# Patient Record
Sex: FEMALE | Race: WHITE | Employment: FULL TIME | URBAN - METROPOLITAN AREA
[De-identification: names, ages, dates, MRNs, and addresses within clinical notes are randomized per-mention and may not be internally consistent; named-entity substitution may affect disease eponyms.]

---

## 2017-03-14 ENCOUNTER — TELEPHONE (OUTPATIENT)
Dept: FAMILY MEDICINE | Facility: CLINIC | Age: 22
End: 2017-03-14

## 2017-03-14 NOTE — LETTER
[facility]                                                   [address] [place]  [address]    April 3, 2017    Jake CID Labrant  [address] [facility] [address]      Dear Jake,       After reviewing your chart, I have determined you are due for a PHQ-9 questionnaire. The PHQ-9 is a nine question survey tool that helps us determine how your depression is doing based on the last two weeks. Please complete the questionnaire and return in the envelope provided.          Thank you,   Patsy Andino PA-C/ks  [phone]

## 2017-03-14 NOTE — TELEPHONE ENCOUNTER
PHQ-9 due now for patient ( 5-7 months from index date)  Index date:       9/1/16  Index PHQ9 :   20  FU start date:   2/1/17  FU End date :   4/1/17    RN- Contact patient for PHQ-9. Remission considered if follow up PHQ-9 less than 5.  If greater than 5 consider follow up appointment, e-visit for medication follow up and evaluation.

## 2017-03-16 ENCOUNTER — OFFICE VISIT (OUTPATIENT)
Dept: URGENT CARE | Facility: URGENT CARE | Age: 22
End: 2017-03-16
Payer: COMMERCIAL

## 2017-03-16 VITALS
WEIGHT: 141 LBS | TEMPERATURE: 97.2 F | HEART RATE: 102 BPM | SYSTOLIC BLOOD PRESSURE: 142 MMHG | DIASTOLIC BLOOD PRESSURE: 83 MMHG | OXYGEN SATURATION: 98 % | BODY MASS INDEX: 25.79 KG/M2

## 2017-03-16 DIAGNOSIS — M26.609 TMJ (TEMPOROMANDIBULAR JOINT SYNDROME): ICD-10-CM

## 2017-03-16 DIAGNOSIS — G44.201 ACUTE INTRACTABLE TENSION-TYPE HEADACHE: Primary | ICD-10-CM

## 2017-03-16 PROCEDURE — 99214 OFFICE O/P EST MOD 30 MIN: CPT | Mod: 25 | Performed by: PHYSICIAN ASSISTANT

## 2017-03-16 PROCEDURE — 96372 THER/PROPH/DIAG INJ SC/IM: CPT | Performed by: PHYSICIAN ASSISTANT

## 2017-03-16 RX ORDER — KETOROLAC TROMETHAMINE 30 MG/ML
60 INJECTION, SOLUTION INTRAMUSCULAR; INTRAVENOUS ONCE
Qty: 2 ML | Refills: 0 | OUTPATIENT
Start: 2017-03-16 | End: 2017-03-16

## 2017-03-16 RX ORDER — CYCLOBENZAPRINE HCL 10 MG
10 TABLET ORAL
Qty: 14 TABLET | Refills: 1 | Status: SHIPPED | OUTPATIENT
Start: 2017-03-16 | End: 2017-10-17

## 2017-03-16 NOTE — MR AVS SNAPSHOT
After Visit Summary   3/16/2017    Jake Gordillo    MRN: [id number]           Patient Information     Date Of Birth          1995        Visit Information        Provider Department      3/16/2017 7:00 PM Jessica Newsome PA-C [facility]        Today's Diagnoses     Acute intractable tension-type headache    -  1    TMJ (temporomandibular joint syndrome)           Follow-ups after your visit        Who to contact     If you have questions or need follow up information about today's clinic visit or your schedule please contact [facility] directly at [phone].  Normal or non-critical lab and imaging results will be communicated to you by MyChart, letter or phone within 4 business days after the clinic has received the results. If you do not hear from us within 7 days, please contact the clinic through [email or web address] or phone. If you have a critical or abnormal lab result, we will notify you by phone as soon as possible.  Submit refill requests through [employer] or call your pharmacy and they will forward the refill request to us. Please allow 3 business days for your refill to be completed.          Additional Information About Your Visit        MyChart Information     [employer] gives you secure access to your electronic health record. If you see a primary care provider, you can also send messages to your care team and make appointments. If you have questions, please call your primary care clinic.  If you do not have a primary care provider, please call [phone] and they will assist you.        Care EveryWhere ID     This is your Care EveryWhere ID. This could be used by other organizations to access your [facility] medical records  [id number]        Your Vitals Were     Pulse Temperature Pulse Oximetry BMI (Body Mass Index)          102 97.2  F (36.2  C) (Oral) 98% 25.79 kg/m2         Blood Pressure from Last 3 Encounters:   03/16/17 142/83   11/28/16 135/84    11/17/16 130/80    Weight from Last 3 Encounters:   03/16/17 141 lb (64 kg)   12/09/16 137 lb (62.1 kg)   11/28/16 137 lb 12.8 oz (62.5 kg)              We Performed the Following     INJECTION INTRAMUSCULAR OR SUB-Q          Today's Medication Changes          These changes are accurate as of: 3/16/17  7:45 PM.  If you have any questions, ask your nurse or doctor.               Start taking these medicines.        Dose/Directions    cyclobenzaprine 10 MG tablet   Commonly known as:  FLEXERIL   Used for:  Acute intractable tension-type headache   Started by:  Jessica Newsome PA-C        Dose:  10 mg   Take 1 tablet (10 mg) by mouth nightly as needed for muscle spasms   Quantity:  14 tablet   Refills:  1       ketorolac 60 MG/2ML Soln injection   Commonly known as:  TORADOL   Used for:  Acute intractable tension-type headache   Started by:  Jessica Newsome PA-C        Dose:  60 mg   Inject 2 mLs (60 mg) into the muscle once for 1 dose   Quantity:  2 mL   Refills:  0            Where to get your medicines      These medications were sent to [id number] - [place], [place] - [address] [address] [facility]  [address] [address] [facility] [address]    Hours:  24-hours Phone:  [phone]     cyclobenzaprine 10 MG tablet         Some of these will need a paper prescription and others can be bought over the counter.  Ask your nurse if you have questions.     You don't need a prescription for these medications     ketorolac 60 MG/2ML Soln injection                Primary Care Provider Office Phone # Fax #    Kallie Goldberg [phone]       [facility] [address] [address] [address]        Thank you!     Thank you for choosing [facility]  for your care. Our goal is always to provide you with excellent care. Hearing back from our patients is one way we can continue to improve our services. Please take a few  minutes to complete the written survey that you may receive in the mail after your visit with us. Thank you!             Your Updated Medication List - Protect others around you: Learn how to safely use, store and throw away your medicines at www.disposemymeds.org.          This list is accurate as of: 3/16/17  7:45 PM.  Always use your most recent med list.                   Brand Name Dispense Instructions for use    chlorhexidine 0.12 % solution    PERIDEX    473 mL    Swish and spit 15 mLs in mouth 2 times daily       clindamycin 300 MG capsule    CLEOCIN    40 capsule    Take 1 capsule (300 mg) by mouth 4 times daily       cyclobenzaprine 10 MG tablet    FLEXERIL    14 tablet    Take 1 tablet (10 mg) by mouth nightly as needed for muscle spasms       dexamethasone 1 MG/ML (HIGH CONC) solution    DECADRON    10 mL    Take 10 mLs (10 mg) by mouth once for 1 dose       hydrOXYzine 25 MG tablet    ATARAX     Take 50 mg by mouth       ketorolac 60 MG/2ML Soln injection    TORADOL    2 mL    Inject 2 mLs (60 mg) into the muscle once for 1 dose       QUEtiapine 100 MG tablet    SEROquel     Take 100 mg by mouth Reported on 3/16/2017

## 2017-03-17 NOTE — NURSING NOTE
The following medication was given:     MEDICATION: Ketorolac Tromethamine 60MG/2ML (30 mg/mL) (Toradol)  ROUTE: IM  SITE: Deltoid - Right  DOSE: 2 ml  LOT #: [id number]   :  [email or web address]  EXPIRATION DATE:  02/30/2018  NDC#: 92861-559-30  Lacey PAULINO CMA ([facility])  8:07 PM 3/16/2017

## 2017-03-17 NOTE — PROGRESS NOTES
SUBJECTIVE:                                                    Jake Gordillo is a 22 year old female who presents to clinic today for the following health issues:      Headaches      Duration: 1 month    Description  Location: bilateral in the frontal area but last day on right. Vomit x1 today. Aunt with migraines. Light bothers her  Character: throbbing pain, constant pressure  Frequency:  daily  Duration:  Varies     Intensity:  moderate    Accompanying signs and symptoms:    Precipitating or Alleviating factors:  Nausea/vomiting: occasionally  Dizziness: no  Weakness or numbness: no  Visual changes: none  Fever: no   Sinus or URI symptoms no     History  Head trauma: no   Family history of migraines: YES  Previous tests for headaches: no   Neurologist evaluations: no   Able to do daily activities when headache present: no   Wake with headaches: YES  Daily pain medication use: YES  Any changes in: none    Precipitating or Alleviating factors (light/sound/sleep/caffeine): sleep    Therapies tried and outcome: Excedrin    Outcome - not effective  Frequent/daily pain medication use: YES         Past medical history and medications were reviewed and are up to date.    REVIEW OF SYSTEMS :   GENERAL: No change in weight, sleep or appetite.  Normal energy.  No fever or chills  RESP: No coughing, wheezing or shortness of breath  CV: No chest pains or palpitations  GI: No nausea, vomiting,  heartburn, abdominal pain, diarrhea, constipation or change in bowel habits  : No urinary frequency or dysuria, bladder or kidney problems    OBJECTIVE:  Blood pressure 142/83, pulse 102, temperature 97.2  F (36.2  C), temperature source Oral, weight 141 lb (64 kg), SpO2 98 %, not currently breastfeeding.    General: No apparent distress, alert and oriented.  HEENT:  EOMI, PERRL, sclera and conjunctiva normal.   Neck:  supple, no lymphadenopathy  Chest:  Lungs clear to auscultation bilaterally.  Cardiovascular: regular rate  and rhythm, no murmurs.  Musculoskeletal: no gross deformities, normal muscle tone  Psychological:  Affect and mentation normal.  Speech fluent.  Judgement and insight intact.  Neurological:    Cranial nerves 2-12 are grossly normal.    Strength 5/5  and symmetric in upper and lower extremities.     Sensation to light touch grossly intact throughout    Reflexes 2+ and symmetrical at biceps, knees.    Gait is normal.  R > L TMJ- tender with clicking.  Ears- TM's translucent    ASSESSMENT:    ICD-10-CM    1. Acute intractable tension-type headache G44.201 cyclobenzaprine (FLEXERIL) 10 MG tablet     ketorolac (TORADOL) 60 MG/2ML SOLN injection     INJECTION INTRAMUSCULAR OR SUB-Q   2. TMJ (temporomandibular joint syndrome) M26.609        PLAN: HA with ? Vascular and tension components. See Dentist. Avoid gum chewing.  Return to clinic if worse or not improved.    Jessica Newsome PA-C

## 2017-03-17 NOTE — NURSING NOTE
"Chief Complaint   Patient presents with     Headache       Initial /83  Pulse 102  Temp 97.2  F (36.2  C) (Oral)  Wt 141 lb (64 kg)  SpO2 98%  BMI 25.79 kg/m2 Estimated body mass index is 25.79 kg/(m^2) as calculated from the following:    Height as of 12/9/16: 5' 2\" (1.575 m).    Weight as of this encounter: 141 lb (64 kg).  BP completed using cuff size: humaira PAULINO CMA ([facility])  7:19 PM 3/16/2017    "

## 2017-03-28 NOTE — TELEPHONE ENCOUNTER
"[employer] message sent to patient again;  She did view the one from 3/14.  Diana Baer RN

## 2017-04-03 NOTE — TELEPHONE ENCOUNTER
Mailed PHQ9 Letter with PHQ9 and return envelope enclosed. Postponed x2 weeks for return.    Marie Mcmahon,

## 2017-04-11 ENCOUNTER — TELEPHONE (OUTPATIENT)
Dept: FAMILY MEDICINE | Facility: CLINIC | Age: 22
End: 2017-04-11

## 2017-04-11 NOTE — TELEPHONE ENCOUNTER
Left message on answering machine for patient/parent to call back.  [place] [phone]   Jahaira Mena RN

## 2017-04-11 NOTE — LETTER
[facility]                                                   [address] [place]  [address]    April 18, 2017    Jake CID Labrant  [address] [facility] [address]      Dear Jake,       After reviewing your chart, I have determined you are due for a PHQ-9 questionnaire. The PHQ-9 is a nine question survey tool that helps us determine how your depression is doing based on the last two weeks. Please complete the questionnaire and return in the envelope provided.          Thank you,   Patsy Andino PA-C/ks  [phone]

## 2017-05-18 DIAGNOSIS — F43.10 POSTTRAUMATIC STRESS DISORDER: Primary | ICD-10-CM

## 2017-05-18 LAB
CHOLEST SERPL-MCNC: 149 MG/DL
GLUCOSE SERPL-MCNC: 78 MG/DL (ref 70–99)
HBA1C MFR BLD: 5 % (ref 4.3–6)
HDLC SERPL-MCNC: 72 MG/DL
LDLC SERPL CALC-MCNC: 68 MG/DL
NONHDLC SERPL-MCNC: 77 MG/DL
TRIGL SERPL-MCNC: 47 MG/DL

## 2017-05-18 PROCEDURE — 83036 HEMOGLOBIN GLYCOSYLATED A1C: CPT | Performed by: PHYSICIAN ASSISTANT

## 2017-05-18 PROCEDURE — 82947 ASSAY GLUCOSE BLOOD QUANT: CPT | Performed by: PHYSICIAN ASSISTANT

## 2017-05-18 PROCEDURE — 36415 COLL VENOUS BLD VENIPUNCTURE: CPT | Performed by: PHYSICIAN ASSISTANT

## 2017-05-18 PROCEDURE — 80061 LIPID PANEL: CPT | Performed by: PHYSICIAN ASSISTANT

## 2017-10-01 ENCOUNTER — HEALTH MAINTENANCE LETTER (OUTPATIENT)
Age: 22
End: 2017-10-01

## 2017-10-17 ENCOUNTER — OFFICE VISIT (OUTPATIENT)
Dept: FAMILY MEDICINE | Facility: CLINIC | Age: 22
End: 2017-10-17
Payer: COMMERCIAL

## 2017-10-17 VITALS
DIASTOLIC BLOOD PRESSURE: 89 MMHG | WEIGHT: 150 LBS | BODY MASS INDEX: 27.44 KG/M2 | SYSTOLIC BLOOD PRESSURE: 156 MMHG | HEART RATE: 118 BPM | TEMPERATURE: 98.4 F

## 2017-10-17 DIAGNOSIS — N92.6 MISSED MENSES: Primary | ICD-10-CM

## 2017-10-17 LAB — BETA HCG QUAL IFA URINE: POSITIVE

## 2017-10-17 PROCEDURE — 99213 OFFICE O/P EST LOW 20 MIN: CPT | Performed by: FAMILY MEDICINE

## 2017-10-17 PROCEDURE — 84703 CHORIONIC GONADOTROPIN ASSAY: CPT | Performed by: FAMILY MEDICINE

## 2017-10-17 ASSESSMENT — ANXIETY QUESTIONNAIRES
2. NOT BEING ABLE TO STOP OR CONTROL WORRYING: NEARLY EVERY DAY
1. FEELING NERVOUS, ANXIOUS, OR ON EDGE: NEARLY EVERY DAY
3. WORRYING TOO MUCH ABOUT DIFFERENT THINGS: NEARLY EVERY DAY
7. FEELING AFRAID AS IF SOMETHING AWFUL MIGHT HAPPEN: MORE THAN HALF THE DAYS
5. BEING SO RESTLESS THAT IT IS HARD TO SIT STILL: NEARLY EVERY DAY
IF YOU CHECKED OFF ANY PROBLEMS ON THIS QUESTIONNAIRE, HOW DIFFICULT HAVE THESE PROBLEMS MADE IT FOR YOU TO DO YOUR WORK, TAKE CARE OF THINGS AT HOME, OR GET ALONG WITH OTHER PEOPLE: VERY DIFFICULT
6. BECOMING EASILY ANNOYED OR IRRITABLE: MORE THAN HALF THE DAYS
GAD7 TOTAL SCORE: 19

## 2017-10-17 ASSESSMENT — PATIENT HEALTH QUESTIONNAIRE - PHQ9
5. POOR APPETITE OR OVEREATING: NEARLY EVERY DAY
SUM OF ALL RESPONSES TO PHQ QUESTIONS 1-9: 16

## 2017-10-17 NOTE — PROGRESS NOTES
SUBJECTIVE:  22 year old.The patient has a history of    Patient's last menstrual period was 2017. normal.  Periods are regular and 31 days Patient is sure of date.  Last bcp was years.  OBJECTIVE:  no apparent distress  /89  Pulse 118  Temp 98.4  F (36.9  C) (Oral)  Wt 150 lb (68 kg)  LMP 2017  BMI 27.44 kg/m2   preg test is positive   ASSESSMENT:   Pregnancy  PLAN: disccussed cats, travel,work, wt gain,medications, call messeges, nutrition, genetic testing and prenatal visit.  Re check  First ob

## 2017-10-17 NOTE — LETTER
My Depression Action Plan  Name: Jake Gordillo   Date of Birth 1995  Date: 10/17/2017    My doctor: Kallie Goldberg   My clinic: [facility]  [id number] [place] [address] [phone]          GREEN    ZONE   Good Control    What it looks like:     Things are going generally well. You have normal up s and down s. You may even feel depressed from time to time, but bad moods usually last less than a day.   What you need to do:  1. Continue to care for yourself (see self care plan)  2. Check your depression survival kit and update it as needed  3. Follow your physician s recommendations including any medication.  4. Do not stop taking medication unless you consult with your physician first.           YELLOW         ZONE Getting Worse    What it looks like:     Depression is starting to interfere with your life.     It may be hard to get out of bed; you may be starting to isolate yourself from others.    Symptoms of depression are starting to last most all day and this has happened for several days.     You may have suicidal thoughts but they are not constant.   What you need to do:     1. Call your care team, your response to treatment will improve if you keep your care team informed of your progress. Yellow periods are signs an adjustment may need to be made.     2. Continue your self-care, even if you have to fake it!    3. Talk to someone in your support network    4. Open up your depression survival kit           RED    ZONE Medical Alert - Get Help    What it looks like:     Depression is seriously interfering with your life.     You may experience these or other symptoms: You can t get out of bed most days, can t work or engage in other necessary activities, you have trouble taking care of basic hygiene, or basic responsibilities, thoughts of suicide or death that will not go away, self-injurious behavior.     What you need to do:  1. Call your care team and  request a same-day appointment. If they are not available (weekends or after hours) call your local crisis line, emergency room or 911.      Electronically signed by: Sher Howard, October 17, 2017    Depression Self Care Plan / Survival Kit    Self-Care for Depression  Here s the deal. Your body and mind are really not as separate as most people think.  What you do and think affects how you feel and how you feel influences what you do and think. This means if you do things that people who feel good do, it will help you feel better.  Sometimes this is all it takes.  There is also a place for medication and therapy depending on how severe your depression is, so be sure to consult with your medical provider and/ or Behavioral Health Consultant if your symptoms are worsening or not improving.     In order to better manage my stress, I will:    Exercise  Get some form of exercise, every day. This will help reduce pain and release endorphins, the  feel good  chemicals in your brain. This is almost as good as taking antidepressants!  This is not the same as joining a gym and then never going! (they count on that by the way ) It can be as simple as just going for a walk or doing some gardening, anything that will get you moving.      Hygiene   Maintain good hygiene (Get out of bed in the morning, Make your bed, Brush your teeth, Take a shower, and Get dressed like you were going to work, even if you are unemployed).  If your clothes don't fit try to get ones that do.    Diet  I will strive to eat foods that are good for me, drink plenty of water, and avoid excessive sugar, caffeine, alcohol, and other mood-altering substances.  Some foods that are helpful in depression are: complex carbohydrates, B vitamins, flaxseed, fish or fish oil, fresh fruits and vegetables.    Psychotherapy  I agree to participate in Individual Therapy (if recommended).    Medication  If prescribed medications, I agree to take them.  Missing  doses can result in serious side effects.  I understand that drinking alcohol, or other illicit drug use, may cause potential side effects.  I will not stop my medication abruptly without first discussing it with my provider.    Staying Connected With Others  I will stay in touch with my friends, family members, and my primary care provider/team.    Use your imagination  Be creative.  We all have a creative side; it doesn t matter if it s oil painting, sand castles, or mud pies! This will also kick up the endorphins.    Witness Beauty  (AKA stop and smell the roses) Take a look outside, even in mid-winter. Notice colors, textures. Watch the squirrels and birds.     Service to others  Be of service to others.  There is always someone else in need.  By helping others we can  get out of ourselves  and remember the really important things.  This also provides opportunities for practicing all the other parts of the program.    Humor  Laugh and be silly!  Adjust your TV habits for less news and crime-drama and more comedy.    Control your stress  Try breathing deep, massage therapy, biofeedback, and meditation. Find time to relax each day.     My support system    Clinic Contact:  Phone number:    Contact 1:  Phone number:    Contact 2:  Phone number:    Jew/:  Phone number:    Therapist:  Phone number:    Local crisis center:    Phone number:    Other community support:  Phone number:

## 2017-10-17 NOTE — NURSING NOTE
"Chief Complaint   Patient presents with     Amenorrhea     LMP: 9/11/17, periods were regular, positive home tests        Initial /89  Pulse 118  Temp 98.4  F (36.9  C) (Oral)  Wt 150 lb (68 kg)  LMP 09/11/2017  BMI 27.44 kg/m2 Estimated body mass index is 27.44 kg/(m^2) as calculated from the following:    Height as of 12/9/16: 5' 2\" (1.575 m).    Weight as of this encounter: 150 lb (68 kg).  Medication Reconciliation: complete  Sher Boyd CMA    "

## 2017-10-17 NOTE — MR AVS SNAPSHOT
After Visit Summary   10/17/2017    Jake Gordillo    MRN: [id number]           Patient Information     Date Of Birth          1995        Visit Information        Provider Department      10/17/2017 3:15 PM Matteo Duarte MD [facility]        Today's Diagnoses     Missed menses    -  1      Care Instructions    This patient would like to be added to your June deliveries?          Follow-ups after your visit        Your next 10 appointments already scheduled     Nov 06, 2017  2:20 PM CST   [facility]   [facility]    [address] [facility] [address] [phone]              Who to contact     If you have questions or need follow up information about today's clinic visit or your schedule please contact [facility] directly at [phone].  Normal or non-critical lab and imaging results will be communicated to you by Hooplahart, letter or phone within 4 business days after the clinic has received the results. If you do not hear from us within 7 days, please contact the clinic through [employer]spring[employer] or phone. If you have a critical or abnormal lab result, we will notify you by phone as soon as possible.  Submit refill requests through [employer] or call your pharmacy and they will forward the refill request to us. Please allow 3 business days for your refill to be completed.          Additional Information About Your Visit        MyChart Information     [employer] gives you secure access to your electronic health record. If you see a primary care provider, you can also send messages to your care team and make appointments. If you have questions, please call your primary care clinic.  If you do not have a primary care provider, please call [phone] and they will assist you.        Care EveryWhere ID     This is your Care EveryWhere ID. This could be used by other  organizations to access your [facility] medical records  [id number]        Your Vitals Were     Pulse Temperature Last Period BMI (Body Mass Index)          118 98.4  F (36.9  C) (Oral) 09/11/2017 27.44 kg/m2         Blood Pressure from Last 3 Encounters:   10/17/17 156/89   03/16/17 142/83   11/28/16 135/84    Weight from Last 3 Encounters:   10/17/17 150 lb (68 kg)   03/16/17 141 lb (64 kg)   12/09/16 137 lb (62.1 kg)              We Performed the Following     Beta HCG qual IFA urine     DEPRESSION ACTION PLAN (DAP)        Primary Care Provider Office Phone # Fax #    Kallie Mari Goldberg [phone] [address] [facility] [address]        Equal Access to Services     OSKAR THIBODEAUX : Hadii mansi guajardo hadasho Soomaali, waaxda luqadaha, qaybta kaalmada adeegyada, willard galdamez . So [facility] [phone].    ATENCIÓN: Si habla español, tiene a kuo disposición servicios gratuitos de asistencia lingüística. Patricia al [phone].    We comply with applicable federal civil rights laws and [place] laws. We do not discriminate on the basis of race, color, national origin, age, disability, sex, sexual orientation, or gender identity.            Thank you!     Thank you for choosing [facility]  for your care. Our goal is always to provide you with excellent care. Hearing back from our patients is one way we can continue to improve our services. Please take a few minutes to complete the written survey that you may receive in the mail after your visit with us. Thank you!             Your Updated Medication List - Protect others around you: Learn how to safely use, store and throw away your medicines at www.disposemymeds.org.          This list is accurate as of: 10/17/17  3:56 PM.  Always use your most recent med list.                   Brand Name Dispense Instructions for use Diagnosis    PRENATAL ADULT GUMMY/DHA/FA PO

## 2017-10-18 ASSESSMENT — ANXIETY QUESTIONNAIRES: GAD7 TOTAL SCORE: 19

## 2017-10-31 ENCOUNTER — TELEPHONE (OUTPATIENT)
Dept: FAMILY MEDICINE | Facility: CLINIC | Age: 22
End: 2017-10-31

## 2017-10-31 NOTE — TELEPHONE ENCOUNTER
Patient is 7 week ob.  First ob is 11/6/17.   Per patient unable to keep water or food down.  Per patient, mom had hyperemesis.  Per patient last urination, ~ 4 hours, dark brown.    Last fluid intake Sunday.    Advise emergency department for hydration therapy.   Call back when discharged to talk about strategies for care moving forward.   Verbalized good understanding.   Jahaira Mena RN

## 2017-10-31 NOTE — TELEPHONE ENCOUNTER
Patient is calling stating she isnt a patient of provider yet, but has her first appt scheduled 11/06. Calling stating she is about 7 weeks pregnant and hasnt been able to keep anything down and would like to discuss. Thank you.

## 2017-11-06 ENCOUNTER — PRENATAL OFFICE VISIT (OUTPATIENT)
Dept: FAMILY MEDICINE | Facility: CLINIC | Age: 22
End: 2017-11-06
Payer: COMMERCIAL

## 2017-11-06 VITALS
HEIGHT: 62 IN | SYSTOLIC BLOOD PRESSURE: 130 MMHG | OXYGEN SATURATION: 99 % | WEIGHT: 144 LBS | HEART RATE: 107 BPM | BODY MASS INDEX: 26.5 KG/M2 | DIASTOLIC BLOOD PRESSURE: 80 MMHG

## 2017-11-06 DIAGNOSIS — Z34.01 ENCOUNTER FOR SUPERVISION OF NORMAL FIRST PREGNANCY IN FIRST TRIMESTER: Primary | ICD-10-CM

## 2017-11-06 DIAGNOSIS — N39.0 URINARY TRACT INFECTION WITHOUT HEMATURIA, SITE UNSPECIFIED: ICD-10-CM

## 2017-11-06 DIAGNOSIS — F33.1 MAJOR DEPRESSIVE DISORDER, RECURRENT EPISODE, MODERATE (H): ICD-10-CM

## 2017-11-06 LAB
ERYTHROCYTE [DISTWIDTH] IN BLOOD BY AUTOMATED COUNT: 13.7 % (ref 10–15)
HCT VFR BLD AUTO: 40 % (ref 35–47)
HGB BLD-MCNC: 13.5 G/DL (ref 11.7–15.7)
MCH RBC QN AUTO: 30.2 PG (ref 26.5–33)
MCHC RBC AUTO-ENTMCNC: 33.8 G/DL (ref 31.5–36.5)
MCV RBC AUTO: 90 FL (ref 78–100)
PLATELET # BLD AUTO: 334 10E9/L (ref 150–450)
RBC # BLD AUTO: 4.47 10E12/L (ref 3.8–5.2)
TSH SERPL DL<=0.005 MIU/L-ACNC: 2.34 MU/L (ref 0.4–4)
WBC # BLD AUTO: 15.1 10E9/L (ref 4–11)

## 2017-11-06 PROCEDURE — G0145 SCR C/V CYTO,THINLAYER,RESCR: HCPCS | Performed by: FAMILY MEDICINE

## 2017-11-06 PROCEDURE — 85027 COMPLETE CBC AUTOMATED: CPT | Performed by: FAMILY MEDICINE

## 2017-11-06 PROCEDURE — 87088 URINE BACTERIA CULTURE: CPT | Performed by: FAMILY MEDICINE

## 2017-11-06 PROCEDURE — 84443 ASSAY THYROID STIM HORMONE: CPT | Performed by: FAMILY MEDICINE

## 2017-11-06 PROCEDURE — 87086 URINE CULTURE/COLONY COUNT: CPT | Performed by: FAMILY MEDICINE

## 2017-11-06 PROCEDURE — 99207 ZZC FIRST OB VISIT: CPT | Performed by: FAMILY MEDICINE

## 2017-11-06 PROCEDURE — 87389 HIV-1 AG W/HIV-1&-2 AB AG IA: CPT | Performed by: FAMILY MEDICINE

## 2017-11-06 PROCEDURE — 86762 RUBELLA ANTIBODY: CPT | Performed by: FAMILY MEDICINE

## 2017-11-06 PROCEDURE — 86900 BLOOD TYPING SEROLOGIC ABO: CPT | Performed by: FAMILY MEDICINE

## 2017-11-06 PROCEDURE — 86850 RBC ANTIBODY SCREEN: CPT | Performed by: FAMILY MEDICINE

## 2017-11-06 PROCEDURE — 86901 BLOOD TYPING SEROLOGIC RH(D): CPT | Performed by: FAMILY MEDICINE

## 2017-11-06 PROCEDURE — 87491 CHLMYD TRACH DNA AMP PROBE: CPT | Performed by: FAMILY MEDICINE

## 2017-11-06 PROCEDURE — 87340 HEPATITIS B SURFACE AG IA: CPT | Performed by: FAMILY MEDICINE

## 2017-11-06 PROCEDURE — 36415 COLL VENOUS BLD VENIPUNCTURE: CPT | Performed by: FAMILY MEDICINE

## 2017-11-06 PROCEDURE — 86780 TREPONEMA PALLIDUM: CPT | Performed by: FAMILY MEDICINE

## 2017-11-06 PROCEDURE — 87186 SC STD MICRODIL/AGAR DIL: CPT | Performed by: FAMILY MEDICINE

## 2017-11-06 RX ORDER — DIPHENHYDRAMINE HYDROCHLORIDE 25 MG/1
CAPSULE ORAL
Refills: 0 | COMMUNITY
Start: 2017-11-01 | End: 2017-12-19

## 2017-11-06 NOTE — PATIENT INSTRUCTIONS
Check with insurance to be sure [facility] is covered as the delivering hospital.      Next visit in 6 weeks

## 2017-11-06 NOTE — MR AVS SNAPSHOT
After Visit Summary   11/6/2017    Jake Gordillo    MRN: [id number]           Patient Information     Date Of Birth          1995        Visit Information        Provider Department      11/6/2017 2:20 PM Kallie Goldberg MD [facility]        Today's Diagnoses     Encounter for supervision of normal first pregnancy in first trimester    -  1    Major depressive disorder, recurrent episode, moderate (H)          Care Instructions        Check with insurance to be sure [facility] is covered as the delivering hospital.      Next visit in 6 weeks          Follow-ups after your visit        Who to contact     If you have questions or need follow up information about today's clinic visit or your schedule please contact [facility] directly at [phone].  Normal or non-critical lab and imaging results will be communicated to you by MyChart, letter or phone within 4 business days after the clinic has received the results. If you do not hear from us within 7 days, please contact the clinic through netTALKhart or phone. If you have a critical or abnormal lab result, we will notify you by phone as soon as possible.  Submit refill requests through Iperia or call your pharmacy and they will forward the refill request to us. Please allow 3 business days for your refill to be completed.          Additional Information About Your Visit        MyChart Information     Iperia gives you secure access to your electronic health record. If you see a primary care provider, you can also send messages to your care team and make appointments. If you have questions, please call your primary care clinic.  If you do not have a primary care provider, please call [phone] and they will assist you.        Care EveryWhere ID     This is your Care EveryWhere ID. This could be used by other organizations to access your [place] medical records  [id number]        Your Vitals Were     Pulse Height  "Last Period Pulse Oximetry BMI (Body Mass Index)       107 5' 2\" (1.575 m) 09/11/2017 99% 26.34 kg/m2        Blood Pressure from Last 3 Encounters:   11/06/17 130/80   10/17/17 156/89   03/16/17 142/83    Weight from Last 3 Encounters:   11/06/17 144 lb (65.3 kg)   10/17/17 150 lb (68 kg)   03/16/17 141 lb (64 kg)              We Performed the Following     ABO/Rh type and screen     Anti Treponema     CBC with platelets     Chlamydia trachomatis PCR     Hepatitis B surface antigen     HIV Antigen Antibody Combo     Pap imaged thin layer screen only - recommended age 21 - 24 years     Rubella Antibody IgG Quantitative     TSH with free T4 reflex     Urine Culture Aerobic Bacterial        Primary Care Provider Office Phone # Fax #    Klalie Goldberg [phone] [address] [facility] [address]        Equal Access to Services     CHRISTIANO THIBODEAUX : Hadii aad ku hadasho Soomaali, waaxda luqadaha, qaybta kaalmada adeegyada, waxay idiin hayaan olivia goodmann . So [facility] [phone].    ATENCIÓN: Si habla español, tiene a kuo disposición servicios gratuitos de asistencia lingüística. Patricia al [phone].    We comply with applicable federal civil rights laws and [place] laws. We do not discriminate on the basis of race, color, national origin, age, disability, sex, sexual orientation, or gender identity.            Thank you!     Thank you for choosing [facility]  for your care. Our goal is always to provide you with excellent care. Hearing back from our patients is one way we can continue to improve our services. Please take a few minutes to complete the written survey that you may receive in the mail after your visit with us. Thank you!             Your Updated Medication List - Protect others around you: Learn how to safely use, store and throw away your medicines at www.disposemymeds.org.          This list is accurate as of: 11/6/17  3:08 PM.  Always use your most " recent med list.                   Brand Name Dispense Instructions for use Diagnosis    doxylamine 25 MG Tabs tablet    UNISOM     Take 25 mg by mouth        PRENATAL ADULT GUMMY/DHA/FA PO           pyridOXINE 25 MG tablet    vitamin B-6     TK 1 T PO TID

## 2017-11-06 NOTE — PROGRESS NOTES
"Jake Gordillo  is a 22 year old co-habitating who presents to the clinic for a new ob visit.    Estimated Date of Delivery: Jun 18, 2018 is calculated from Patient's last menstrual period was 09/11/2017.   She has not had bleeding since her LMP.   She has had nausea resulting in non-bilious Weigh loss has not occurred.   This was not a planned pregnancy but not prevented  FOB is involved,     OTHER CONCERNS: none    INFECTION HISTORY  HIV: No  Hepatitis B: No  Hepatitis C: No  Syphilis:  No  Tuberculosis: no   PPD- no  Herpes self: no  Herpes partner:  no  Chlamydia:  no  Gonorrhea:  no  HPV: no  BV:  no  Trichomonis:  no  Chicken Pox:  vaccinated  ====================================================  PERSONAL/SOCIAL HISTORY  Lives with partner.  Employment: Full time.  Her job involves moderate activity .  Additional items: None  =====================================================   REVIEW OF SYSTEMS  C: NEGATIVE for fever, chills  E: NEGATIVE for vision changes   R: NEGATIVE for significant cough or SOB  CV: NEGATIVE for chest pain, palpitations   GI: NEGATIVE for nausea, abdominal pain, heartburn, or change in bowel habits  : NEGATIVE for frequency, dysuria, or hematuria  M: NEGATIVE for significant arthralgias or myalgia  N: NEGATIVE for weakness, dizziness or paresthesias or headache  ====================================================  PHYSICAL EXAM:  /80  Pulse 107  Ht 5' 2\" (1.575 m)  Wt 144 lb (65.3 kg)  LMP 09/11/2017  SpO2 99%  BMI 26.34 kg/m2  BMI- Body mass index is 26.34 kg/(m^2).,     RECOMMENDED WEIGHT GAIN: 15-25 lbs.  GENERAL:  Pleasant pregnant female, alert, well groomed.  SKIN:  Warm and dry, without lesions or rashes  HEAD: Symmetrical features.  EYES:  PERRLA,   MOUTH:  Buccal mucosa pink, moist without lesions.    NECK:  Thyroid without enlargement and nodules.  Lymph nodes not palpable.   LUNGS:  Clear to auscultation.  BREAST:  Symmetrical.  No dominant, fixed or " suspicious masses are noted.  No skin or nipple changes or axillary nodes.  Self exam is taught and encouraged.  Nipples everted.      HEART:  RRR without murmur.  ABDOMEN: Soft without masses , tenderness or organomegaly.  No CVA tenderness. No scars noted.. FHT not checked due to gest age, recent US 7 w 1d  MENTAL STATUS:  Alert, oriented x3.  Speech fluent with normal naming, repetition, comprehension.   CRANIAL NERVES:   Pupils are equal, round, reactive to light.  Extraocular movements full.  Visual fields full.  Facial sensation, movement normal.  Palate moves symmetrically.  Tongue midline.  Sternocleidomastoid and trapezius strength intact.    Motor 5/5.  Reflexes 2/4.      EXTREMITIES:  No edema. No significant varicosities.   GENITALIA:  BUS WNL, no lesions noted   VAGINA:  Pink, normal rugae and discharge normal and physiologic,   CERVIX:  smooth, without discharge or CMT and nulliparous os,   firm/ closed 3 cm long.  UTERUS: Midposition, nontender 8 weeks in size.  ADNEXA: Without masses or tenderness  PELVIS:   Adequate, Pelvis proven to -pelvis not tested.  .      =========================================  ASSESSMENT:  (Z34.01) Encounter for supervision of normal first pregnancy in first trimester  (primary encounter diagnosis)  Comment: see below, improved nausea with unisom and vit b6  Plan: CBC with platelets, Rubella Antibody IgG         Quantitative, Anti Treponema, Hepatitis B         surface antigen, HIV Antigen Antibody Combo,         Chlamydia trachomatis PCR, Urine Culture         Aerobic Bacterial, TSH with free T4 reflex,         ABO/Rh type and screen, Pap imaged thin layer         screen only - recommended age 21 - 24 years            (F33.1) Major depressive disorder, recurrent episode, moderate (H)  Comment: not on meds currently  Plan: pt to report worsening mood consider zoloft or prozac     PREGNANCY AT RISK? at risk due to  depression  ==========================================  PLAN:  Instructed on use of triage nurse line and contacting the on call provider after hours for an urgent need such as fever, vagina bleeding, bladder or vaginal infection, rupture of membranes,  or term labor.    Discussed the indications, uses for and false positives for quad screen, nuchal translucency and fetal survey ultrasound at 18-20 weeks gestation. Will inform us at the next visit if she wished to avail herself of these screens.  Instructed on best evidence for: weight gain for her BMI for pregnancy; healthy diet and foods to avoid; exercise and activity during pregnancy;avoiding exposure to toxoplasmosis; and maintenance of a generally healthy lifestyle.   Discussed the harms, benefits, side effects and alternative therapies for current prescribed and OTC medications.

## 2017-11-06 NOTE — NURSING NOTE
"Chief Complaint   Patient presents with     Prenatal Care     6/18/18       Initial /80  Pulse 107  Ht 5' 2\" (1.575 m)  Wt 144 lb (65.3 kg)  LMP 09/11/2017  SpO2 99%  BMI 26.34 kg/m2 Estimated body mass index is 26.34 kg/(m^2) as calculated from the following:    Height as of this encounter: 5' 2\" (1.575 m).    Weight as of this encounter: 144 lb (65.3 kg).  Medication Reconciliation: complete  Jeanna Cardenas CMA    "

## 2017-11-07 LAB
ABO + RH BLD: NORMAL
ABO + RH BLD: NORMAL
BLD GP AB SCN SERPL QL: NORMAL
BLOOD BANK CMNT PATIENT-IMP: NORMAL
C TRACH DNA SPEC QL NAA+PROBE: NEGATIVE
HBV SURFACE AG SERPL QL IA: NONREACTIVE
HIV 1+2 AB+HIV1 P24 AG SERPL QL IA: NONREACTIVE
RUBV IGG SERPL IA-ACNC: 8 IU/ML
SPECIMEN EXP DATE BLD: NORMAL
SPECIMEN SOURCE: NORMAL
T PALLIDUM IGG+IGM SER QL: NEGATIVE

## 2017-11-09 LAB
COPATH REPORT: NORMAL
PAP: NORMAL

## 2017-11-10 LAB
BACTERIA SPEC CULT: ABNORMAL
BACTERIA SPEC CULT: ABNORMAL
SPECIMEN SOURCE: ABNORMAL

## 2017-11-10 RX ORDER — NITROFURANTOIN 25; 75 MG/1; MG/1
100 CAPSULE ORAL 2 TIMES DAILY
Qty: 14 CAPSULE | Refills: 0 | Status: SHIPPED | OUTPATIENT
Start: 2017-11-10 | End: 2017-12-19

## 2017-11-13 ENCOUNTER — MYC MEDICAL ADVICE (OUTPATIENT)
Dept: FAMILY MEDICINE | Facility: CLINIC | Age: 22
End: 2017-11-13

## 2017-11-13 DIAGNOSIS — O21.9 NAUSEA/VOMITING IN PREGNANCY: Primary | ICD-10-CM

## 2017-11-13 RX ORDER — METOCLOPRAMIDE 10 MG/1
10 TABLET ORAL 4 TIMES DAILY PRN
Qty: 120 TABLET | Refills: 1 | Status: SHIPPED | OUTPATIENT
Start: 2017-11-13 | End: 2017-11-21 | Stop reason: ALTCHOICE

## 2017-11-14 NOTE — TELEPHONE ENCOUNTER
Per protocol, will route encounter to be cosigned by provider for Verbal Orders.  Jahaira Mena RN

## 2017-11-21 ENCOUNTER — MYC MEDICAL ADVICE (OUTPATIENT)
Dept: FAMILY MEDICINE | Facility: CLINIC | Age: 22
End: 2017-11-21

## 2017-11-21 DIAGNOSIS — O21.9 NAUSEA/VOMITING IN PREGNANCY: Primary | ICD-10-CM

## 2017-11-21 RX ORDER — ONDANSETRON 4 MG/1
4 TABLET, ORALLY DISINTEGRATING ORAL EVERY 8 HOURS PRN
Qty: 30 TABLET | Refills: 1 | Status: SHIPPED | OUTPATIENT
Start: 2017-11-21 | End: 2017-12-19

## 2017-11-21 NOTE — TELEPHONE ENCOUNTER
Per protocol, will route encounter to be cosigned by provider for Verbal Orders.  Jahaira eMna RN

## 2017-11-30 ENCOUNTER — TELEPHONE (OUTPATIENT)
Dept: FAMILY MEDICINE | Facility: CLINIC | Age: 22
End: 2017-11-30

## 2017-11-30 ENCOUNTER — MYC MEDICAL ADVICE (OUTPATIENT)
Dept: FAMILY MEDICINE | Facility: CLINIC | Age: 22
End: 2017-11-30

## 2017-11-30 DIAGNOSIS — O21.0 HYPEREMESIS GRAVIDARUM: Primary | ICD-10-CM

## 2017-11-30 RX ORDER — PROMETHAZINE HYDROCHLORIDE 25 MG/1
25 TABLET ORAL EVERY 6 HOURS PRN
Qty: 20 TABLET | Refills: 1 | Status: SHIPPED | OUTPATIENT
Start: 2017-11-30 | End: 2017-12-19

## 2017-11-30 NOTE — TELEPHONE ENCOUNTER
11w3d  Per protocol, will route encounter to be cosigned by provider for Verbal Orders.  Jahaira Mena RN

## 2017-12-17 PROBLEM — Z34.02 ENCOUNTER FOR SUPERVISION OF NORMAL FIRST PREGNANCY IN SECOND TRIMESTER: Status: ACTIVE | Noted: 2017-12-17

## 2017-12-19 ENCOUNTER — PRENATAL OFFICE VISIT (OUTPATIENT)
Dept: FAMILY MEDICINE | Facility: CLINIC | Age: 22
End: 2017-12-19
Payer: COMMERCIAL

## 2017-12-19 VITALS
DIASTOLIC BLOOD PRESSURE: 83 MMHG | OXYGEN SATURATION: 99 % | SYSTOLIC BLOOD PRESSURE: 145 MMHG | WEIGHT: 150 LBS | TEMPERATURE: 98.9 F | HEART RATE: 100 BPM | BODY MASS INDEX: 27.44 KG/M2

## 2017-12-19 DIAGNOSIS — Z34.02 ENCOUNTER FOR SUPERVISION OF NORMAL FIRST PREGNANCY IN SECOND TRIMESTER: Primary | ICD-10-CM

## 2017-12-19 DIAGNOSIS — O21.9 NAUSEA/VOMITING IN PREGNANCY: ICD-10-CM

## 2017-12-19 LAB
ALBUMIN UR-MCNC: NEGATIVE MG/DL
APPEARANCE UR: CLEAR
BILIRUB UR QL STRIP: NEGATIVE
COLOR UR AUTO: YELLOW
GLUCOSE UR STRIP-MCNC: NEGATIVE MG/DL
HGB UR QL STRIP: NEGATIVE
KETONES UR STRIP-MCNC: NEGATIVE MG/DL
LEUKOCYTE ESTERASE UR QL STRIP: NEGATIVE
NITRATE UR QL: NEGATIVE
PH UR STRIP: 6.5 PH (ref 5–7)
SOURCE: NORMAL
SP GR UR STRIP: 1.01 (ref 1–1.03)
UROBILINOGEN UR STRIP-ACNC: 0.2 EU/DL (ref 0.2–1)

## 2017-12-19 PROCEDURE — 87086 URINE CULTURE/COLONY COUNT: CPT | Performed by: FAMILY MEDICINE

## 2017-12-19 PROCEDURE — 99207 ZZC COMPLICATED OB VISIT: CPT | Performed by: FAMILY MEDICINE

## 2017-12-19 PROCEDURE — 81003 URINALYSIS AUTO W/O SCOPE: CPT | Performed by: FAMILY MEDICINE

## 2017-12-19 RX ORDER — ONDANSETRON 4 MG/1
4 TABLET, ORALLY DISINTEGRATING ORAL EVERY 8 HOURS PRN
Qty: 30 TABLET | Refills: 1 | Status: SHIPPED | OUTPATIENT
Start: 2017-12-19 | End: 2018-02-12

## 2017-12-19 NOTE — MR AVS SNAPSHOT
After Visit Summary   12/19/2017    Jake Gordillo    MRN: [id number]           Patient Information     Date Of Birth          1995        Visit Information        Provider Department      12/19/2017 4:20 PM Kallie Goldberg MD [facility]        Today's Diagnoses     Encounter for supervision of normal first pregnancy in second trimester    -  1    Nausea/vomiting in pregnancy          Care Instructions    Report to or call [facility] [phone] for concerns about pregnancy or Labor.      Next visit 4 weeks          Follow-ups after your visit        Your next 10 appointments already scheduled     Richard 15, 2018  3:40 PM CST   ESTABL[facility]ATAL with [facility]allie Goldberg MD   [facility]    [address] [facility] [address] [phone]            Feb 12, 2018  5:20 PM CST   ESTABLISHED PRENATAL with Kallie Goldberg MD   [facility]    [address] [facility] [address] [phone]              Who to contact     If you have questions or need follow up information about today's clinic visit or your schedule please contact [facility] directly at [phone].  Normal or non-critical lab and imaging results will be communicated to you by MyChart, letter or phone within 4 business days after the clinic has received the results. If you do not hear from us within 7 days, please contact the clinic through Illumagearhart or phone. If you have a critical or abnormal lab result, we will notify you by phone as soon as possible.  Submit refill requests through T-System or call your pharmacy and they will forward the refill request to us. Please allow 3 business days for your refill to be completed.          Additional Information About Your Visit        Illumagearhart Information     T-System gives you secure access to your electronic health record. If you see a primary care  provider, you can also send messages to your care team and make appointments. If you have questions, please call your primary care clinic.  If you do not have a primary care provider, please call [phone] and they will assist you.        Care EveryWhere ID     This is your Care EveryWhere ID. This could be used by other organizations to access your [facility] medical records  [id number]        Your Vitals Were     Pulse Temperature Last Period Pulse Oximetry BMI (Body Mass Index)       100 98.9  F (37.2  C) (Oral) 09/11/2017 99% 27.44 kg/m2        Blood Pressure from Last 3 Encounters:   12/19/17 145/83   11/06/17 130/80   10/17/17 156/89    Weight from Last 3 Encounters:   12/19/17 150 lb (68 kg)   11/06/17 144 lb (65.3 kg)   10/17/17 150 lb (68 kg)              We Performed the Following     *UA reflex to Microscopic and Culture ([place] [facility] (except [place] and [place])     Urine Culture Aerobic Bacterial          Where to get your medicines      These medications were sent to "[facility] [address] [place] [address] & [place]  [address] [facility] [address]    Hours:  24-hours Phone:  [phone]     ondansetron 4 MG ODT tab          Primary Care Provider Office Phone # Fax #    Kallie Mari Goldberg [phone] [address] [facility] [address]        Equal Access to Services     [facility] CARLOS EDUARDO AH: Hadii aad ku hadasho Soomaali, waaxda luqadaha, qaybta kaalmada adeegyada, willard villegas. So [facility] [phone].    ATENCIÓN: Si habla español, tiene a kuo disposición servicios gratuitos de asistencia lingüística. Llame al [phone].    We comply with applicable federal civil rights laws and [place] laws. We do not discriminate on the basis of race, color, national origin, age, disability, sex, sexual orientation, or gender identity.            Thank you!     Thank you for  [facility]  [facility]. Our goal is always to provide you with excellent care. Hearing back from our patients is one way we can continue to improve our services. Please take a few minutes to complete the written survey that you may receive in the mail after your visit with us. Thank you!             Your Updated Medication List - Protect others around you: Learn how to safely use, store and throw away your medicines at www.disposemymeds.org.          This list is accurate as of: 12/19/17 11:59 PM.  Always use your most recent med list.                   Brand Name Dispense Instructions for use Diagnosis    doxylamine 25 MG Tabs tablet    UNISOM     Take 25 mg by mouth        ondansetron 4 MG ODT tab    ZOFRAN ODT    30 tablet    Take 1 tablet (4 mg) by mouth every 8 hours as needed for nausea    Nausea/vomiting in pregnancy       PRENATAL ADULT GUMMY/DHA/FA PO

## 2017-12-19 NOTE — PROGRESS NOTES
Doing well.  No concerns today.  Prenatal flowsheet information is reviewed.  Discussed triple/quad screening.  She declines testing at this time.  Reportable signs and symptoms discussed.  Next visit 4 weeks.   Nausea controlled with zofran, refilled

## 2017-12-19 NOTE — NURSING NOTE
"Chief Complaint   Patient presents with     Prenatal Care       Initial /83  Pulse 100  Temp 98.9  F (37.2  C) (Oral)  Wt 150 lb (68 kg)  LMP 09/11/2017  SpO2 99%  BMI 27.44 kg/m2 Estimated body mass index is 27.44 kg/(m^2) as calculated from the following:    Height as of 11/6/17: 5' 2\" (1.575 m).    Weight as of this encounter: 150 lb (68 kg).  Medication Reconciliation: complete  Michelle Martinez M.A.    "

## 2017-12-20 LAB
BACTERIA SPEC CULT: NORMAL
SPECIMEN SOURCE: NORMAL

## 2017-12-23 NOTE — PATIENT INSTRUCTIONS
Report to or call [facility] [phone] for concerns about pregnancy or Labor.      Next visit 4 weeks

## 2018-01-04 ENCOUNTER — OFFICE VISIT (OUTPATIENT)
Dept: FAMILY MEDICINE | Facility: CLINIC | Age: 23
End: 2018-01-04
Payer: COMMERCIAL

## 2018-01-04 VITALS
TEMPERATURE: 98.3 F | DIASTOLIC BLOOD PRESSURE: 82 MMHG | HEIGHT: 62 IN | SYSTOLIC BLOOD PRESSURE: 130 MMHG | BODY MASS INDEX: 26.5 KG/M2 | WEIGHT: 144 LBS | OXYGEN SATURATION: 97 % | HEART RATE: 127 BPM

## 2018-01-04 DIAGNOSIS — R05.9 COUGH: Primary | ICD-10-CM

## 2018-01-04 LAB
FLUAV+FLUBV AG SPEC QL: NEGATIVE
FLUAV+FLUBV AG SPEC QL: POSITIVE
SPECIMEN SOURCE: ABNORMAL

## 2018-01-04 PROCEDURE — 99213 OFFICE O/P EST LOW 20 MIN: CPT | Performed by: FAMILY MEDICINE

## 2018-01-04 PROCEDURE — 87804 INFLUENZA ASSAY W/OPTIC: CPT | Performed by: FAMILY MEDICINE

## 2018-01-04 NOTE — PROGRESS NOTES
"SUBJECTIVE:   Jake Gordillo is a 23 year old female presenting with a chief complaint of chest congestion and a cough.  The patient first noted the onset of symptoms was 3 day(s) ago.  The patient (or parent) reports that she first had symptoms of nasal congestion, rhinorrhea and a cough . After that she started having symptoms of a cough and wheezing. After that she started having symptoms of fever up to 100.2. Other symptoms that followed include muscle aches in her back and headaches.    She (or parent) denies: shortness of breath and wheezing.      The patient has the following predisposing factors for infection: history of smoking 3 years about 1 ppd.  She is about 16 weeks pregnant      Patient Active Problem List   Diagnosis     Anxiety     Acne     Major depressive disorder, recurrent episode, moderate (H)     Encounter for supervision of normal first pregnancy in second trimester     Current Outpatient Prescriptions   Medication Sig Dispense Refill     ondansetron (ZOFRAN ODT) 4 MG ODT tab Take 1 tablet (4 mg) by mouth every 8 hours as needed for nausea 30 tablet 1     Prenatal MV & Min w/FA-DHA (PRENATAL ADULT GUMMY/DHA/FA PO)        Social History   Substance Use Topics     Smoking status: Former Smoker     Smokeless tobacco: Never Used      Comment: nonsmoking household     Alcohol use No         OBJECTIVE  :/82  Pulse 127  Temp 98.3  F (36.8  C) (Oral)  Ht 5' 2\" (1.575 m)  Wt 144 lb (65.3 kg)  LMP 09/11/2017  SpO2 97%  BMI 26.34 kg/m2  GENERAL APPEARANCE: healthy, alert and no distress  EYES: EOMI,  PERRL, conjunctiva clear  HENT: ear canals and TM's normal.  Nose and mouth without ulcers, erythema or lesions  NECK: supple, nontender, no lymphadenopathy  RESP: lungs clear to auscultation - no rales, rhonchi or wheezes and rhonchi scattered  CV: regular rates and rhythm, normal S1 S2, no murmur noted  ABDOMEN:  soft, nontender, no HSM or masses and bowel sounds normal  NEURO: Normal " strength and tone, sensory exam grossly normal,  normal speech and mentation  SKIN: no suspicious lesions or rashes    Results for orders placed or performed in visit on 01/04/18   Influenza A/B antigen   Result Value Ref Range    Influenza A/B Agn Specimen Nasal     Influenza A Positive (A) NEG^Negative    Influenza B Negative NEG^Negative         ASSESSMENT:  Influenza    PLAN:  I recommended that the patient get lots of fluids and rest and I asked that the patient to return to clinic for an appointment in a few day(s) if she is not feeling better.        During the visit I did wear a mask the entire time I was in the exam room with the patient.

## 2018-01-04 NOTE — NURSING NOTE
"Chief Complaint   Patient presents with     URI     x 3 days, cough and congestion. fever off and on. patient is pregnant        Initial /82  Pulse 127  Temp 98.3  F (36.8  C) (Oral)  Ht 5' 2\" (1.575 m)  Wt 144 lb (65.3 kg)  LMP 09/11/2017  SpO2 97%  BMI 26.34 kg/m2 Estimated body mass index is 26.34 kg/(m^2) as calculated from the following:    Height as of this encounter: 5' 2\" (1.575 m).    Weight as of this encounter: 144 lb (65.3 kg).  Medication Reconciliation: complete     Em Flores cma    "

## 2018-01-05 ENCOUNTER — MYC MEDICAL ADVICE (OUTPATIENT)
Dept: FAMILY MEDICINE | Facility: CLINIC | Age: 23
End: 2018-01-05

## 2018-01-05 NOTE — TELEPHONE ENCOUNTER
Placed letter at the  for patient to . Sent patient message via Clickyreserva letting her know this was done.    Marie Mcmahon,

## 2018-01-05 NOTE — LETTER
[facility]  [address] [facility] [address]  Phone: [phone]    January 4, 2018        Jake Gordillo  [address]   [facility] [address]          To whom it may concern:    RE: Jake Gordillo    Patient was seen and treated today at our clinic and missed work. She was diagnosed with influenza A.  Patient may return to work when she no longer has fevers and when she feels well enough to handle a full day(s) of work.       Please contact me for questions or concerns.      Sincerely,        Raad Pennington MD

## 2018-01-15 ENCOUNTER — PRENATAL OFFICE VISIT (OUTPATIENT)
Dept: FAMILY MEDICINE | Facility: CLINIC | Age: 23
End: 2018-01-15
Payer: COMMERCIAL

## 2018-01-15 VITALS
HEIGHT: 62 IN | SYSTOLIC BLOOD PRESSURE: 136 MMHG | OXYGEN SATURATION: 100 % | DIASTOLIC BLOOD PRESSURE: 77 MMHG | WEIGHT: 150 LBS | BODY MASS INDEX: 27.6 KG/M2 | HEART RATE: 98 BPM

## 2018-01-15 DIAGNOSIS — F33.1 MAJOR DEPRESSIVE DISORDER, RECURRENT EPISODE, MODERATE (H): ICD-10-CM

## 2018-01-15 DIAGNOSIS — Z34.02 ENCOUNTER FOR SUPERVISION OF NORMAL FIRST PREGNANCY IN SECOND TRIMESTER: Primary | ICD-10-CM

## 2018-01-15 DIAGNOSIS — F41.9 ANXIETY: ICD-10-CM

## 2018-01-15 PROCEDURE — 99207 ZZC PRENATAL VISIT: CPT | Performed by: FAMILY MEDICINE

## 2018-01-15 NOTE — MR AVS SNAPSHOT
After Visit Summary   1/15/2018    Jake Gordillo    MRN: [id number]           Patient Information     Date Of Birth          1995        Visit Information        Provider Department      1/15/2018 3:40 PM Kallie Goldberg MD [facility]        Today's Diagnoses     Encounter for supervision of normal first pregnancy in second trimester    -  1    Major depressive disorder, recurrent episode, moderate (H)        Anxiety          Care Instructions    Report to or call [facility] [phone] for concerns about pregnancy or Labor.    Next visit 4 weeks      Please  call [phone] to schedule your ultrasound.      Start zoloft per label instructions.            Follow-ups after your visit        Your next 10 appointments already scheduled     Feb 12, 2018  5:20 PM CST   ESTABLISHED PRENATAL with Kallie Goldberg MD   [facility] ([facility])    [address] [address] [facility] [address] [phone]              Future tests that were ordered for you today     Open Future Orders        Priority Expected Expires Ordered    US OB > 14 Weeks Complete Single Routine  1/14/2019 1/15/2018            Who to contact     If you have questions or need follow up information about today's clinic visit or your schedule please contact [facility] directly at [phone].  Normal or non-critical lab and imaging results will be communicated to you by MyChart, letter or phone within 4 business days after the clinic has received the results. If you do not hear from us within 7 days, please contact the clinic through MyChart or phone. If you have a critical or abnormal lab result, we will notify you by phone as soon as possible.  Submit refill requests through [employer] or call your pharmacy and they will forward the refill request to us. Please allow 3 business days for your refill to be completed.          Additional Information About Your Visit       "  MyChart Information     [employer] gives you secure access to your electronic health record. If you see a primary care provider, you can also send messages to your care team and make appointments. If you have questions, please call your primary care clinic.  If you do not have a primary care provider, please call [phone] and they will assist you.        Care EveryWhere ID     This is your Care EveryWhere ID. This could be used by other organizations to access your [place] medical records  [id number]        Your Vitals Were     Pulse Height Last Period Pulse Oximetry BMI (Body Mass Index)       98 5' 2\" (1.575 m) 09/11/2017 100% 27.44 kg/m2        Blood Pressure from Last 3 Encounters:   01/15/18 136/77   01/04/18 130/82   12/19/17 145/83    Weight from Last 3 Encounters:   01/15/18 150 lb (68 kg)   01/04/18 144 lb (65.3 kg)   12/19/17 150 lb (68 kg)                 Today's Medication Changes          These changes are accurate as of: 1/15/18  4:20 PM.  If you have any questions, ask your nurse or doctor.               Start taking these medicines.        Dose/Directions    sertraline 50 MG tablet   Commonly known as:  ZOLOFT   Used for:  Major depressive disorder, recurrent episode, moderate (H), Anxiety   Started by:  aKllie Goldberg MD        Take 1/2 tablet (25 mg) for 1-2 weeks, then increase to 1 tablet orally daily   Quantity:  30 tablet   Refills:  0            Where to get your medicines      These medications [facility] [facility] [address]  [address] [facility] [address] [address]    Hours:  24-hours Phone:  [phone]     sertraline 50 MG tablet                Primary Care Provider Office Phone # Fax #    Kallie Goldberg [phone] [address] [address] [address]        Equal Access to Services     OSKAR THIBODEAUX AH: betina Rodriguez, " dayron garibaytheresaarabella osmanastrid debporfirio villegas. So [facility] [phone].    ATENCIÓN: Si autumn rooney, tiene a kuo disposición servicios gratuitos de asistencia lingüística. Patricia al [phone].    We comply with applicable federal civil rights laws and [place] laws. We do not discriminate on the basis of race, color, national origin, age, disability, sex, sexual orientation, or gender identity.            Thank you!     Thank you for choosing [facility]  for your care. Our goal is always to provide you with excellent care. Hearing back from our patients is one way we can continue to improve our services. Please take a few minutes to complete the written survey that you may receive in the mail after your visit with us. Thank you!             Your Updated Medication List - Protect others around you: Learn how to safely use, store and throw away your medicines at www.disposemymeds.org.          This list is accurate as of: 1/15/18  4:20 PM.  Always use your most recent med list.                   Brand Name Dispense Instructions for use Diagnosis    ondansetron 4 MG ODT tab    ZOFRAN ODT    30 tablet    Take 1 tablet (4 mg) by mouth every 8 hours as needed for nausea    Nausea/vomiting in pregnancy       PRENATAL ADULT GUMMY/DHA/FA PO           sertraline 50 MG tablet    ZOLOFT    30 tablet    Take 1/2 tablet (25 mg) for 1-2 weeks, then increase to 1 tablet orally daily    Major depressive disorder, recurrent episode, moderate (H), Anxiety

## 2018-01-15 NOTE — PATIENT INSTRUCTIONS
Report to or call [facility] [phone] for concerns about pregnancy or Labor.    Next visit 4 weeks      Please  call [phone] to schedule your ultrasound.      Start zoloft per label instructions.

## 2018-01-15 NOTE — NURSING NOTE
"Chief Complaint   Patient presents with     Prenatal Care       Initial /77  Pulse 98  Ht 5' 2\" (1.575 m)  Wt 150 lb (68 kg)  LMP 09/11/2017  SpO2 100%  BMI 27.44 kg/m2 Estimated body mass index is 27.44 kg/(m^2) as calculated from the following:    Height as of this encounter: 5' 2\" (1.575 m).    Weight as of this encounter: 150 lb (68 kg).  Medication Reconciliation: complete    Dea Brar MA    "

## 2018-01-15 NOTE — PROGRESS NOTES
Doing well.  No concerns today.  Prenatal flowsheet information is reviewed.  Discussed triple/quad screening.  She is uncertain about testing at this time. Will call prior to 20 wks to have drawn if desires  Discussed kick counts and fetal movement.  Reportable signs and symptoms discussed.  Next visit in 4 weeks

## 2018-01-22 ENCOUNTER — DOCUMENTATION ONLY (OUTPATIENT)
Dept: LAB | Facility: CLINIC | Age: 23
End: 2018-01-22

## 2018-01-22 NOTE — PROGRESS NOTES
This patient is scheduled for lab work on 1/24/2018 but does not qualify for pre-visit protocol. Please place future orders, or have your care team call and advise patient to cancel lab appointment. She has an appointment with Dr. Goldberg on 2/12/2018.    Thank you,    [facility]

## 2018-01-24 ENCOUNTER — ALLIED HEALTH/NURSE VISIT (OUTPATIENT)
Dept: NURSING | Facility: CLINIC | Age: 23
End: 2018-01-24
Payer: COMMERCIAL

## 2018-01-24 VITALS — WEIGHT: 151 LBS | BODY MASS INDEX: 27.62 KG/M2

## 2018-01-24 DIAGNOSIS — Z34.02 ENCOUNTER FOR SUPERVISION OF NORMAL FIRST PREGNANCY IN SECOND TRIMESTER: Primary | ICD-10-CM

## 2018-01-24 DIAGNOSIS — O28.0 ABNORMAL QUAD SCREEN: ICD-10-CM

## 2018-01-24 PROCEDURE — 81511 FTL CGEN ABNOR FOUR ANAL: CPT | Mod: 90 | Performed by: FAMILY MEDICINE

## 2018-01-24 PROCEDURE — 36415 COLL VENOUS BLD VENIPUNCTURE: CPT | Performed by: FAMILY MEDICINE

## 2018-01-24 PROCEDURE — 99000 SPECIMEN HANDLING OFFICE-LAB: CPT | Performed by: FAMILY MEDICINE

## 2018-01-24 PROCEDURE — 99207 ZZC NO CHARGE LOS: CPT

## 2018-01-24 NOTE — MR AVS SNAPSHOT
After Visit Summary   1/24/2018    Jake Gordillo    MRN: [id number]           Patient Information     Date Of Birth          1995        Visit Information        Provider Department      1/24/2018 3:45 PM AN ANCILLARY [facility]        Today's Diagnoses     Encounter for supervision of normal first pregnancy in second trimester    -  1    Abnormal quad screen           Follow-ups after your visit        Additional Services     MAT FETAL MED CTR REFERRAL-PREGNANCY       >> Patient may proceed with recommendations for further testing as directed by the Maternal Fetal Medicine Specialist >>    >> If requesting Fetal Echo: MFM will determine appropriate location for exam due to indication.    >> If requesting Lung Maturity Amnio:  If results indicate fetal lung maturity, induction or C/S is recommended within 36 hours.  Please schedule accordingly.     Dear Patient:   Please be aware that coverage of these services is subject to the terms and limitations of your health insurance plan.  Call member services at your health plan with any benefit or coverage questions.      Please bring the following to your appointment:    >>  Any x-rays, CTs or MRIs which have been performed.  Contact the facility where they were done to arrange for  prior to your scheduled appointment.  Any new CT, MRI or other procedures ordered by your specialist must be performed at a [place] facility or coordinated by your clinic's referral office.  >>  List of current medications   >>  This referral request   >>  Any documents/labs given to you for this referral                  Your next 10 appointments already scheduled     Jan 29, 2018  5:00 PM CST   (Arrive by 4:45 PM)   US OB > 14 WEEKS COMPLETE SINGLE with ANDUS1   [facility]    [address] [facility] [address] [phone]           Please bring a list of your medicines (including vitamins,  minerals and over-the-counter drugs). Also, tell your doctor about any allergies you may have. Wear comfortable clothes and leave your valuables at home.  If you re less than 20 weeks drink four 8-ounce glasses of fluid an hour before your exam. If you need to empty your bladder before your exam, try to release only a little urine. Then, drink another glass of fluid.  You may have up to two family members in the exam room. If you bring a small child, an adult must be there to care for him or her.  Please call the Imaging Department at your exam site with any questions.            Feb 12, 2018  5:20 PM CST   ESTABLISHED PRENATAL with Kallie Goldberg MD   [facility]    [address] [address] [facility] [address] [phone]              Who to contact     If you have questions or need follow up information about today's clinic visit or your schedule please contact [facility] directly at [phone].  Normal or non-critical lab and imaging results will be communicated to you by [employer], letter or phone within 4 business days after the clinic has received the results. If you do not hear from us within 7 days, please contact the clinic through [employer] or phone. If you have a critical or abnormal lab result, we will notify you by phone as soon as possible.  Submit refill requests through [employer] or call your pharmacy and they will forward the refill request to us. Please allow 3 business days for your refill to be completed.          Additional Information About Your Visit        [employer] Information     [employer] gives you secure access to your electronic health record. If you see a primary care provider, you can also send messages to your care team and make appointments. If you have questions, please call your primary care clinic.  If you do not have a primary care provider, please call [phone] and they will assist you.        Care EveryWhere ID     This  is your Care EveryWhere ID. This could be used by other organizations to access your [place] medical records  [id number]        Your Vitals Were     Last Period BMI (Body Mass Index)                09/11/2017 27.62 kg/m2           Blood Pressure from Last 3 Encounters:   01/15/18 136/77   01/04/18 130/82   12/19/17 145/83    Weight from Last 3 Encounters:   01/24/18 151 lb (68.5 kg)   01/15/18 150 lb (68 kg)   01/04/18 144 lb (65.3 kg)              We Performed the Following     MAT FETAL MED CTR REFERRAL-PREGNANCY     Maternal quad screen        Primary Care Provider Office Phone # Fax #    Kallie Mari Goldberg [phone]       [address] [facility] [address]        Equal Access to Services     OSKAR THIBODEAUX : Hadii mansi guajardo hadasho Sokush, waaxda luqadaha, qaybta kaalmada adeegyada, willard galdamez . So [facility] [phone].    ATENCIÓN: Si habla español, tiene a kuo disposición servicios gratuitos de asistencia lingüística. Llame al [phone].    We comply with applicable federal civil rights laws and [place] laws. We do not discriminate on the basis of race, color, national origin, age, disability, sex, sexual orientation, or gender identity.            Thank you!     Thank you for choosing [facility]  for your care. Our goal is always to provide you with excellent care. Hearing back from our patients is one way we can continue to improve our services. Please take a few minutes to complete the written survey that you may receive in the mail after your visit with us. Thank you!             Your Updated Medication List - Protect others around you: Learn how to safely use, store and throw away your medicines at www.disposemymeds.org.          This list is accurate as of 1/24/18 11:59 PM.  Always use your most recent med list.                   Brand Name Dispense Instructions for use Diagnosis    ondansetron 4 MG ODT tab    ZOFRAN ODT    30 tablet     Take 1 tablet (4 mg) by mouth every 8 hours as needed for nausea    Nausea/vomiting in pregnancy       PRENATAL ADULT GUMMY/DHA/FA PO           sertraline 50 MG tablet    ZOLOFT    30 tablet    Take 1/2 tablet (25 mg) for 1-2 weeks, then increase to 1 tablet orally daily    Major depressive disorder, recurrent episode, moderate (H), Anxiety

## 2018-01-26 LAB
# FETUSES US: ABNORMAL
AFP ADJ MOM AMN: 0.41
AFP SERPL-MCNC: 21 NG/ML
AGE - REPORTED: 25.4 YR
DATING METHOD: ABNORMAL
DIABETIC AT CONCEPTION: NO
FAMILY MEMBER DISEASES HX: NO
FAMILY MEMBER DISEASES HX: NO
GA METHOD: ABNORMAL
GA: 19.29 WEEKS
HCG MOM SERPL: 1.42
HCG SERPL-ACNC: ABNORMAL IU/L
HX OF HEREDITARY DISORDERS: NO
IDDM PATIENT QL: NO
INHIBIN A MOM SERPL: 1.52
INHIBIN A SERPL-MCNC: 252 PG/ML
INTEGRATED SCN PATIENT-IMP: ABNORMAL
LMP START DATE: ABNORMAL
PATHOLOGY STUDY: ABNORMAL
PREV HX CHROMOSOME ABNORMALITY: NO
SPECIMEN DRAWN SERPL: ABNORMAL
TWINS: ABNORMAL
U ESTRIOL MOM SERPL: 0.85
U ESTRIOL SERPL-MCNC: 1.62 NG/ML

## 2018-01-29 ENCOUNTER — RADIANT APPOINTMENT (OUTPATIENT)
Dept: ULTRASOUND IMAGING | Facility: CLINIC | Age: 23
End: 2018-01-29
Attending: FAMILY MEDICINE
Payer: COMMERCIAL

## 2018-01-29 DIAGNOSIS — Z34.02 ENCOUNTER FOR SUPERVISION OF NORMAL FIRST PREGNANCY IN SECOND TRIMESTER: ICD-10-CM

## 2018-01-29 PROCEDURE — 76805 OB US >/= 14 WKS SNGL FETUS: CPT

## 2018-01-30 ENCOUNTER — PRE VISIT (OUTPATIENT)
Dept: MATERNAL FETAL MEDICINE | Facility: CLINIC | Age: 23
End: 2018-01-30

## 2018-02-02 ENCOUNTER — OFFICE VISIT (OUTPATIENT)
Dept: MATERNAL FETAL MEDICINE | Facility: CLINIC | Age: 23
End: 2018-02-02
Attending: FAMILY MEDICINE
Payer: COMMERCIAL

## 2018-02-02 ENCOUNTER — HOSPITAL ENCOUNTER (OUTPATIENT)
Dept: ULTRASOUND IMAGING | Facility: CLINIC | Age: 23
Discharge: HOME | End: 2018-02-02
Attending: FAMILY MEDICINE | Admitting: FAMILY MEDICINE
Payer: COMMERCIAL

## 2018-02-02 DIAGNOSIS — O26.90 PREGNANCY RELATED CONDITION, ANTEPARTUM: ICD-10-CM

## 2018-02-02 DIAGNOSIS — O28.0 ABNORMAL QUAD SCREEN: Primary | ICD-10-CM

## 2018-02-02 PROCEDURE — 76811 OB US DETAILED SNGL FETUS: CPT

## 2018-02-02 PROCEDURE — 96040 ZZH GENETIC COUNSELING, EACH 30 MINUTES: CPT | Mod: ZF | Performed by: GENETIC COUNSELOR, MS

## 2018-02-02 NOTE — PROGRESS NOTES
[facility]  Genetic Counseling Consult    Patient: Jake Gordillo YOB: 1995   Date of Service:  18      Jake Gordillo was seen at the [facility] for genetic consultation given abnormal quad screen results.      Impression/Plan:   Jake had a level II ultrasound. Please see ultrasound report for additional information. The patient was offered non-invasive prenatal testing (NIPT) or invasive testing (amniocentesis) but declined additional testing options.    Pregnancy History:   /Parity:                            Age at Delivery: 23 year old  TIBURCIO: 2018, by Last Menstrual Period           Gestational Age: 20w4d  -  No significant complications or exposures were reported in the current pregnancy.  -  Pregnancy history:   - EAB    Medical History:   Jake amaro reported medical history is not expected to impact pregnancy management or risks to fetal development.       Family History:   A three-generation pedigree was obtained, and is scanned under the  Media  tab.   The reported family history is negative for multiple miscarriages, stillbirths, birth defects, mental retardation, known genetic conditions, and consanguinity.    Risk Assessment:   We explained that the risk for fetal chromosome abnormalities increases with maternal age. We discussed specific features of common chromosome abnormalities, including Down syndrome, trisomy 13, trisomy 18, and sex chromosome trisomies.      At age 23 at delivery, the midtrimester risk to have a baby with Down syndrome is 1 in 1040.    At age 23 at delivery, the midtrimester risk to have a baby with any chromosome abnormality is 1 in 520.   The patient had a quad screen earlier in pregnancy.     The results were Screen Positive. Chemical values were as follows:    AFP 0.41 MoM, hCG 1.42 MoM, estriol 0.85 MoM, and STEVEN 1.52 MoM    This screen gave the following risk  assessments:    Down syndrome risk= 1:57    Trisomy 18 risk=  <1:10,000    Open neural tube defects risk= < 1:10,000      Testing Options:   We discussed the following options:   Comprehensive (Level II) ultrasound: Detailed ultrasound performed between 18-22 weeks gestation to screen for major birth defects and markers for aneuploidy.     Non-invasive Prenatal Testing (NIPT)    Maternal plasma cell-free DNA testing; first trimester ultrasound with nuchal translucency and nasal bone assessment is recommended, when appropriate    Screens for fetal trisomy 21, trisomy 13, trisomy 18, and sex chromosome aneuploidy    Cannot screen for open neural tube defects; maternal serum AFP after 15 weeks is recommended     Genetic Amniocentesis    Invasive procedure typically performed in the second trimester by which amniotic fluid is obtained for the purpose of chromosome analysis and/or other prenatal genetic analysis    Diagnostic results; >99% sensitivity for fetal chromosome abnormalities    AFAFP measurement tests for open neural tube defects    We reviewed the benefits and limitations of this testing.  Screening tests provide a risk assessment specific to the pregnancy for certain fetal chromosome abnormalities, but cannot definitively diagnose or exclude a fetal chromosome abnormality.  Follow-up genetic counseling and consideration of diagnostic testing is recommended with any abnormal screening result. Diagnostic tests carry inherent risks- including risk of miscarriage- that require careful consideration.  These tests can detect fetal chromosome abnormalities with greater than 99% certainty.  Results can be compromised by maternal cell contamination or mosaicism, and are limited by the resolution of cytogenetic G-banding technology.  There is no screening nor diagnostic test that can detect all forms of birth defects or mental disability.    It was a pleasure to be involved [facility]. Face-to-face time of  the meeting was 40 minutes.  Nadeen Kumar MS, [facility]  Maternal Fetal Medicine  [facility]  Phone:[phone]  Email: [email or web address]

## 2018-02-02 NOTE — MR AVS SNAPSHOT
After Visit Summary   2/2/2018    Jake Gordillo    MRN: [id number]           Patient Information     Date Of Birth          1995        Visit Information        Provider Department      2/2/2018 8:45 AM Nadeen Kumar GC [facility] Maternal Fetal Medicine [facility]        Today's Diagnoses     Pregnancy related condition, antepartum           Follow-ups after your visit        Your next 10 appointments already scheduled     Feb 12, 2018  5:20 PM CST   ESTABLISHED PRENATAL with Kallie Goldberg MD   [facility] ([facility]    [address] [facility] [address] [phone]              Who to contact     If you have questions or need follow up information about today's clinic visit or your schedule please contact [facility] [place] directly at [phone].  Normal or non-critical lab and imaging results will be communicated to you by MyChart, letter or phone within 4 business days after the clinic has received the results. If you do not hear from us within 7 days, please contact the clinic through Blottr[employer] or phone. If you have a critical or abnormal lab result, we will notify you by phone as soon as possible.  Submit refill requests through [employer] or call your pharmacy and they will forward the refill request to us. Please allow 3 business days for your refill to be completed.          Additional Information About Your Visit        MyChart Information     [employer] gives you secure access to your electronic health record. If you see a primary care provider, you can also send messages to your care team and make appointments. If you have questions, please call your primary care clinic.  If you do not have a primary care provider, please call [phone] and they will assist you.        Care EveryWhere ID     This is your Care EveryWhere ID. This could be used by other organizations to access your [facility]  records  [id number]        Your Vitals Were     Last Period                   09/11/2017            Blood Pressure from Last 3 Encounters:   01/15/18 136/77   01/04/18 130/82   12/19/17 145/83    Weight from Last 3 Encounters:   01/24/18 68.5 kg (151 lb)   01/15/18 68 kg (150 lb)   01/04/18 65.3 kg (144 lb)              We Performed the Following     [facility] Genetic Counseling        Primary Care Provider Office Phone # Fax #    Kallie Mari Goldberg [phone] [address] [facility] [address]        Equal Access to Services     [facility]: Hadii mansi guajardo hadjoano Sokush, waaxda luqadaha, qaybta kaalmada oliviayabuck, willard galdamez . So [facility] [phone].    ATENCIÓN: Si habla español, tiene a kuo disposición servicios gratuitos de asistencia lingüística. Llame al [phone].    We comply with applicable federal civil rights laws and [place] laws. We do not discriminate on the basis of race, color, national origin, age, disability, sex, sexual orientation, or gender identity.            Thank you!     Thank you for choosing [facility]  for your care. Our goal is always to provide you with excellent care. Hearing back from our patients is one way we can continue to improve our services. Please take a few minutes to complete the written survey that you may receive in the mail after your visit with us. Thank you!             Your Updated Medication List - Protect others around you: Learn how to safely use, store and throw away your medicines at www.disposemymeds.org.          This list is accurate as of 2/2/18  2:43 PM.  Always use your most recent med list.                   Brand Name Dispense Instructions for use Diagnosis    ondansetron 4 MG ODT tab    ZOFRAN ODT    30 tablet    Take 1 tablet (4 mg) by mouth every 8 hours as needed for nausea    Nausea/vomiting in pregnancy       PRENATAL ADULT GUMMY/DHA/FA PO           sertraline  50 MG tablet    ZOLOFT    30 tablet    Take 1/2 tablet (25 mg) for 1-2 weeks, then increase to 1 tablet orally daily    Major depressive disorder, recurrent episode, moderate (H), Anxiety

## 2018-02-02 NOTE — PROGRESS NOTES
Please see full imaging report from [employer] program under imaging tab.    No identified abnormalities or soft markers of US. Met with genetic counselor and offered both NIPT screen and amniocentesis. Patient aware of limitations of US, but is reassured and declines additional testing/screening.    Kassandra Shine MD  Maternal Fetal Medicine

## 2018-02-02 NOTE — MR AVS SNAPSHOT
After Visit Summary   2/2/2018    Jake Gordillo    MRN: [id number]           Patient Information     Date Of Birth          1995        Visit Information        Provider Department      2/2/2018 10:00 AM Kassandra Shine MD [facility]        Today's Diagnoses     Abnormal quad screen    -  1       Follow-ups after your visit        Your next 10 appointments already scheduled     Feb 12, 2018  5:20 PM CST   ESTABLISHED PRENATAL with Kallie Goldberg MD   [facility]    [address] [facility] [address] [phone]              Who to contact     If you have questions or need follow up information about today's clinic visit or your schedule please contact [facility] directly at [phone].  Normal or non-critical lab and imaging results will be communicated to you by Tembo Studiohart, letter or phone within 4 business days after the clinic has received the results. If you do not hear from us within 7 days, please contact the clinic through Tembo Studiohart or phone. If you have a critical or abnormal lab result, we will notify you by phone as soon as possible.  Submit refill requests through Cleartrip or call your pharmacy and they will forward the refill request to us. Please allow 3 business days for your refill to be completed.          Additional Information About Your Visit        MyChart Information     Cleartrip gives you secure access to your electronic health record. If you see a primary care provider, you can also send messages to your care team and make appointments. If you have questions, please call your primary care clinic.  If you do not have a primary care provider, please call [phone] and they will assist you.        Care EveryWhere ID     This is your Care EveryWhere ID. This could be used by other organizations to access your [place] medical records  [id number]         Your Vitals Were     Last Period                   09/11/2017            Blood Pressure from Last 3 Encounters:   01/15/18 136/77   01/04/18 130/82   12/19/17 145/83    Weight from Last 3 Encounters:   01/24/18 68.5 kg (151 lb)   01/15/18 68 kg (150 lb)   01/04/18 65.3 kg (144 lb)              Today, you had the following     No orders found for display       Primary Care Provider Office Phone # Fax #    Kallie Mari Goldberg [phone] [address] [facility] [address]        Equal Access to Services     [facility]: Hadii aad ku hadasho Soomaali, waaxda luqadaha, qaybta kaalmada adeegyada, willard galdamez . So [facility] [phone].    ATENCIÓN: Si habla español, tiene a kuo disposición servicios gratuitos de asistencia lingüística. Llame al [phone].    We comply with applicable federal civil rights laws and [place] laws. We do not discriminate on the basis of race, color, national origin, age, disability, sex, sexual orientation, or gender identity.            Thank you!     Thank you for choosing [facility]  for your care. Our goal is always to provide you with excellent care. Hearing back from our patients is one way we can continue to improve our services. Please take a few minutes to complete the written survey that you may receive in the mail after your visit with us. Thank you!             Your Updated Medication List - Protect others around you: Learn how to safely use, store and throw away your medicines at www.disposemymeds.org.          This list is accurate as of 2/2/18 10:26 AM.  Always use your most recent med list.                   Brand Name Dispense Instructions for use Diagnosis    ondansetron 4 MG ODT tab    ZOFRAN ODT    30 tablet    Take 1 tablet (4 mg) by mouth every 8 hours as needed for nausea    Nausea/vomiting in pregnancy       PRENATAL ADULT GUMMY/DHA/FA PO           sertraline 50 MG tablet     ZOLOFT    30 tablet    Take 1/2 tablet (25 mg) for 1-2 weeks, then increase to 1 tablet orally daily    Major depressive disorder, recurrent episode, moderate (H), Anxiety

## 2018-02-11 NOTE — PROGRESS NOTES
Doing well.  No concerns today.  Prenatal flowsheet information is reviewed.  Discussed PTL, PROM, and when to call or come in.  Reportable signs and symptoms discussed.  Met with MFM, reassured with their findings and declined amnio.  Continue to smoke but cutting back  Had cig prior to appt.  Discussed quitting and impact on fetus.  Plan growth scan in 6 weeks

## 2018-02-11 NOTE — PATIENT INSTRUCTIONS
Report to or call [facility] [phone] for concerns about pregnancy or Labor.      Next visit 4 weeks  Plan to stay 1 hour for testing.      [email or web address] for help to quit smoking.

## 2018-02-12 ENCOUNTER — PRENATAL OFFICE VISIT (OUTPATIENT)
Dept: FAMILY MEDICINE | Facility: CLINIC | Age: 23
End: 2018-02-12
Payer: COMMERCIAL

## 2018-02-12 VITALS
WEIGHT: 153.6 LBS | TEMPERATURE: 97.3 F | DIASTOLIC BLOOD PRESSURE: 68 MMHG | BODY MASS INDEX: 28.09 KG/M2 | HEART RATE: 106 BPM | SYSTOLIC BLOOD PRESSURE: 130 MMHG

## 2018-02-12 DIAGNOSIS — O21.9 NAUSEA/VOMITING IN PREGNANCY: ICD-10-CM

## 2018-02-12 DIAGNOSIS — Z34.02 ENCOUNTER FOR SUPERVISION OF NORMAL FIRST PREGNANCY IN SECOND TRIMESTER: Primary | ICD-10-CM

## 2018-02-12 DIAGNOSIS — F33.1 MAJOR DEPRESSIVE DISORDER, RECURRENT EPISODE, MODERATE (H): ICD-10-CM

## 2018-02-12 DIAGNOSIS — O99.332 TOBACCO USE COMPLICATING PREGNANCY, SECOND TRIMESTER: ICD-10-CM

## 2018-02-12 DIAGNOSIS — F41.9 ANXIETY: ICD-10-CM

## 2018-02-12 PROCEDURE — 99207 ZZC PRENATAL VISIT: CPT | Performed by: FAMILY MEDICINE

## 2018-02-12 RX ORDER — ONDANSETRON 4 MG/1
4 TABLET, ORALLY DISINTEGRATING ORAL EVERY 8 HOURS PRN
Qty: 30 TABLET | Refills: 1 | Status: SHIPPED | OUTPATIENT
Start: 2018-02-12 | End: 2018-04-23

## 2018-02-12 NOTE — MR AVS SNAPSHOT
After Visit Summary   2/12/2018    Jake Gordillo    MRN: [id number]           Patient Information     Date Of Birth          1995        Visit Information        Provider Department      2/12/2018 5:20 PM Kallie Goldberg MD [facility]        Today's Diagnoses     Encounter for supervision of normal first pregnancy in second trimester    -  1      Care Instructions    Report to or call [facility] L[facility]D [phone] for concerns about pregnancy or Labor.      Next visit 4 weeks  Plan to stay 1 hour for testing.      [email or web address] for help to quit smoking.          Follow-ups after your visit        Who to contact     If you have questions or need follow up information about today's clinic visit or your schedule please contact [facility] directly at [phone].  Normal or non-critical lab and imaging results will be communicated to you by MyChart, letter or phone within 4 business days after the clinic has received the results. If you do not hear from us within 7 days, please contact the clinic through Water Innovatehart or phone. If you have a critical or abnormal lab result, we will notify you by phone as soon as possible.  Submit refill requests through 117go or call your pharmacy and they will forward the refill request to us. Please allow 3 business days for your refill to be completed.          Additional Information About Your Visit        MyChart Information     117go gives you secure access to your electronic health record. If you see a primary care provider, you can also send messages to your care team and make appointments. If you have questions, please call your primary care clinic.  If you do not have a primary care provider, please call [phone] and they will assist you.        Care EveryWhere ID     This is your Care EveryWhere ID. This could be used by other organizations to access your [place] medical records  [id number]        Your Vitals Were      Pulse Temperature Last Period BMI (Body Mass Index)          106 97.3  F (36.3  C) (Oral) 09/11/2017 28.09 kg/m2         Blood Pressure from Last 3 Encounters:   02/12/18 130/68   01/15/18 136/77   01/04/18 130/82    Weight from Last 3 Encounters:   02/12/18 153 lb 9.6 oz (69.7 kg)   01/24/18 151 lb (68.5 kg)   01/15/18 150 lb (68 kg)              Today, you had the following     No orders found for display       Primary Care Provider Office Phone # Fax #    Kallie Mari Goldberg [phone] [address] [facility] [address]        Equal Access to Services     OSKAR THIBODEAUX : Harmeet jacksono Sokush, waaxda luqadaha, qaybta kaalmada adeegyada, willard galdamez . So [facility] [phone].    ATENCIÓN: Si habla español, tiene a kuo disposición servicios gratuitos de asistencia lingüística. Ll[facility] [phone].    We comply with applicable federal civil rights laws and [place] laws. We do not discriminate on the basis of race, color, national origin, age, disability, sex, sexual orientation, or gender identity.            Thank you!     Thank you for choosing [facility]  for your care. Our goal is always to provide you with excellent care. Hearing back from our patients is one way we can continue to improve our services. Please take a few minutes to complete the written survey that you may receive in the mail after your visit with us. Thank you!             Your Updated Medication List - Protect others around you: Learn how to safely use, store and throw away your medicines at www.disposemymeds.org.          This list is accurate as of 2/12/18  5:41 PM.  Always use your most recent med list.                   Brand Name Dispense Instructions for use Diagnosis    ondansetron 4 MG ODT tab    ZOFRAN ODT    30 tablet    Take 1 tablet (4 mg) by mouth every 8 hours as needed for nausea    Nausea/vomiting in pregnancy       PRENATAL ADULT GUMMY/DHA/FA PO            sertraline 50 MG tablet    ZOLOFT    30 tablet    Take 1/2 tablet (25 mg) for 1-2 weeks, then increase to 1 tablet orally daily    Major depressive disorder, recurrent episode, moderate (H), Anxiety

## 2018-02-12 NOTE — NURSING NOTE
"Chief Complaint   Patient presents with     Prenatal Care       Initial /86  Pulse 106  Temp 97.3  F (36.3  C) (Oral)  Wt 153 lb 9.6 oz (69.7 kg)  LMP 09/11/2017  BMI 28.09 kg/m2 Estimated body mass index is 28.09 kg/(m^2) as calculated from the following:    Height as of 1/15/18: 5' 2\" (1.575 m).    Weight as of this encounter: 153 lb 9.6 oz (69.7 kg).  Medication Reconciliation: complete  Sher Boyd CMA    "

## 2018-03-19 ENCOUNTER — PRENATAL OFFICE VISIT (OUTPATIENT)
Dept: FAMILY MEDICINE | Facility: CLINIC | Age: 23
End: 2018-03-19
Payer: COMMERCIAL

## 2018-03-19 VITALS
DIASTOLIC BLOOD PRESSURE: 72 MMHG | HEART RATE: 114 BPM | BODY MASS INDEX: 30.55 KG/M2 | SYSTOLIC BLOOD PRESSURE: 127 MMHG | RESPIRATION RATE: 18 BRPM | OXYGEN SATURATION: 99 % | HEIGHT: 62 IN | WEIGHT: 166 LBS

## 2018-03-19 DIAGNOSIS — O99.012 ANEMIA OF PREGNANCY IN SECOND TRIMESTER: ICD-10-CM

## 2018-03-19 DIAGNOSIS — Z34.02 ENCOUNTER FOR SUPERVISION OF NORMAL FIRST PREGNANCY IN SECOND TRIMESTER: Primary | ICD-10-CM

## 2018-03-19 DIAGNOSIS — O99.332 TOBACCO USE COMPLICATING PREGNANCY, SECOND TRIMESTER: ICD-10-CM

## 2018-03-19 LAB
ALBUMIN SERPL-MCNC: 2.5 G/DL (ref 3.4–5)
ALP SERPL-CCNC: 61 U/L (ref 40–150)
ALT SERPL W P-5'-P-CCNC: 28 U/L (ref 0–50)
ANION GAP SERPL CALCULATED.3IONS-SCNC: 7 MMOL/L (ref 3–14)
AST SERPL W P-5'-P-CCNC: 16 U/L (ref 0–45)
BILIRUB SERPL-MCNC: 0.1 MG/DL (ref 0.2–1.3)
BUN SERPL-MCNC: 10 MG/DL (ref 7–30)
CALCIUM SERPL-MCNC: 8.5 MG/DL (ref 8.5–10.1)
CHLORIDE SERPL-SCNC: 105 MMOL/L (ref 94–109)
CO2 SERPL-SCNC: 26 MMOL/L (ref 20–32)
CREAT SERPL-MCNC: 0.57 MG/DL (ref 0.52–1.04)
ERYTHROCYTE [DISTWIDTH] IN BLOOD BY AUTOMATED COUNT: 13.3 % (ref 10–15)
GFR SERPL CREATININE-BSD FRML MDRD: >90 ML/MIN/1.7M2
GLUCOSE 1H P 50 G GLC PO SERPL-MCNC: 101 MG/DL (ref 60–129)
GLUCOSE SERPL-MCNC: 93 MG/DL (ref 70–99)
HCT VFR BLD AUTO: 30.9 % (ref 35–47)
HGB BLD-MCNC: 9.9 G/DL (ref 11.7–15.7)
MCH RBC QN AUTO: 28.7 PG (ref 26.5–33)
MCHC RBC AUTO-ENTMCNC: 32 G/DL (ref 31.5–36.5)
MCV RBC AUTO: 90 FL (ref 78–100)
PLATELET # BLD AUTO: 356 10E9/L (ref 150–450)
POTASSIUM SERPL-SCNC: 3.6 MMOL/L (ref 3.4–5.3)
PROT SERPL-MCNC: 6.9 G/DL (ref 6.8–8.8)
RBC # BLD AUTO: 3.45 10E12/L (ref 3.8–5.2)
SODIUM SERPL-SCNC: 138 MMOL/L (ref 133–144)
WBC # BLD AUTO: 15.9 10E9/L (ref 4–11)

## 2018-03-19 PROCEDURE — 85027 COMPLETE CBC AUTOMATED: CPT | Performed by: FAMILY MEDICINE

## 2018-03-19 PROCEDURE — 80053 COMPREHEN METABOLIC PANEL: CPT | Performed by: FAMILY MEDICINE

## 2018-03-19 PROCEDURE — 36415 COLL VENOUS BLD VENIPUNCTURE: CPT | Performed by: FAMILY MEDICINE

## 2018-03-19 PROCEDURE — 82950 GLUCOSE TEST: CPT | Performed by: FAMILY MEDICINE

## 2018-03-19 PROCEDURE — 99207 ZZC PRENATAL VISIT: CPT | Performed by: FAMILY MEDICINE

## 2018-03-19 NOTE — PROGRESS NOTES
Doing well.  No concerns today.  1 hour GTT done today.  Prenatal flowsheet information is reviewed.  Discussed kick counts and fetal movement.  Encouraged to quit smoking.  Reportable signs and symptoms discussed.  Check cbc and cmp due to climbing bp.

## 2018-03-19 NOTE — NURSING NOTE
"Chief Complaint   Patient presents with     Prenatal Care       Initial /72  Pulse 114  Resp 18  Ht 5' 2\" (1.575 m)  Wt 166 lb (75.3 kg)  LMP 09/11/2017  SpO2 99%  BMI 30.36 kg/m2 Estimated body mass index is 30.36 kg/(m^2) as calculated from the following:    Height as of this encounter: 5' 2\" (1.575 m).    Weight as of this encounter: 166 lb (75.3 kg).  Medication Reconciliation: complete    Dea Brar MA    "

## 2018-03-19 NOTE — MR AVS SNAPSHOT
After Visit Summary   3/19/2018    Jake Gordillo    MRN: [id number]           Patient Information     Date Of Birth          1995        Visit Information        Provider Department      3/19/2018 3:40 PM Kallie Goldberg MD [facility]        Today's Diagnoses     Encounter for supervision of normal first pregnancy in second trimester    -  1    Tobacco use complicating pregnancy, second trimester          Care Instructions    [facility] [phone] for concerns about pregnancy or Labor.      Next visit in 4 weeks.          Follow-ups after your visit        Your next 10 appointments already scheduled     Apr 16, 2018  1:40 PM CDT   ESTABLISHED PRENATAL with Kallie Goldberg MD   [facility]    [address] [facility] [address] [phone]              Who to contact     If you have questions or need follow up information about today's clinic visit or your schedule please contact [facility] directly at [phone].  Normal or non-critical lab and imaging results will be communicated to you by LanternCRMhart, letter or phone within 4 business days after the clinic has received the results. If you do not hear from us within 7 days, please contact the clinic through [email or web address] or phone. If you have a critical or abnormal lab result, we will notify you by phone as soon as possible.  Submit refill requests through Total-trax or call your pharmacy and they will forward the refill request to us. Please allow 3 business days for your refill to be completed.          Additional Information About Your Visit        LanternCRMhart Information     Total-trax gives you secure access to your electronic health record. If you see a primary care provider, you can also send messages to your care team and make appointments. If you have questions, please call your primary care clinic.  If you do not have a primary care provider,  "please call [phone] and they will assist you.        Care EveryWhere ID     This is your Care EveryWhere ID. This could be used by other organizations to access your [place] medical records  [id number]        Your Vitals Were     Pulse Respirations Height Last Period Pulse Oximetry BMI (Body Mass Index)    112 18 5' 2\" (1.575 m) 09/11/2017 99% 30.36 kg/m2       Blood Pressure from Last 3 Encounters:   03/19/18 161/80   02/12/18 130/68   01/15/18 136/77    Weight from Last 3 Encounters:   03/19/18 166 lb (75.3 kg)   02/12/18 153 lb 9.6 oz (69.7 kg)   01/24/18 151 lb (68.5 kg)              We Performed the Following     Glucose tolerance gest screen 1 hour     OB hemoglobin        Primary Care Provider Office Phone # Fax #    Kallie Goldberg [phone] [address] [facility] [address]        Equal Access to Services     [facility] : Hadii aad ku hadasho Soomaali, waaxda luqadaha, qaybta kaalmada adeegyada, waxay idiin hayantonion olivia galdamez . So [facility] [phone].    ATENCIÓN: Si habla español, tiene a kuo disposición servicios gratuitos de asistencia lingüística. LlJ.W. [facility] [phone].    We comply with applicable federal civil rights laws and [place] laws. We do not discriminate on the basis of race, color, national origin, age, disability, sex, sexual orientation, or gender identity.            Thank you!     Thank you for choosing [facility]  for your care. Our goal is always to provide you with excellent care. Hearing back from our patients is one way we can continue to improve our services. Please take a few minutes to complete the written survey that you may receive in the mail after your visit with us. Thank you!             Your Updated Medication List - Protect others around you: Learn how to safely use, store and throw away your medicines at www.disposemymeds.org.          This list is accurate as of 3/19/18  4:11 PM.  Always use your most " recent med list.                   Brand Name Dispense Instructions for use Diagnosis    ondansetron 4 MG ODT tab    ZOFRAN ODT    30 tablet    Take 1 tablet (4 mg) by mouth every 8 hours as needed for nausea    Nausea/vomiting in pregnancy       PRENATAL ADULT GUMMY/DHA/FA PO           sertraline 50 MG tablet    ZOLOFT    90 tablet    Take 1 tablet (50 mg) by mouth daily    Major depressive disorder, recurrent episode, moderate (H), Anxiety

## 2018-03-19 NOTE — PATIENT INSTRUCTIONS
[facility] [phone] for concerns about pregnancy or Labor.      Next visit in 4 weeks.

## 2018-03-26 ENCOUNTER — MYC MEDICAL ADVICE (OUTPATIENT)
Dept: FAMILY MEDICINE | Facility: CLINIC | Age: 23
End: 2018-03-26

## 2018-04-15 NOTE — PATIENT INSTRUCTIONS
Report to or call [facility] [phone] for concerns about pregnancy or Labor.      Next visit 2 weeks

## 2018-04-15 NOTE — PROGRESS NOTES
Doing well.  No concerns today.  Prenatal flowsheet information is reviewed.  Discussed kick counts and fetal movement.  Reportable signs and symptoms discussed.  tdap today  F/u 2-3 weeks

## 2018-04-16 ENCOUNTER — PRENATAL OFFICE VISIT (OUTPATIENT)
Dept: FAMILY MEDICINE | Facility: CLINIC | Age: 23
End: 2018-04-16
Payer: COMMERCIAL

## 2018-04-16 VITALS
BODY MASS INDEX: 30.98 KG/M2 | DIASTOLIC BLOOD PRESSURE: 82 MMHG | OXYGEN SATURATION: 97 % | SYSTOLIC BLOOD PRESSURE: 128 MMHG | TEMPERATURE: 98.1 F | HEART RATE: 99 BPM | WEIGHT: 169.4 LBS

## 2018-04-16 DIAGNOSIS — Z34.03 ENCOUNTER FOR SUPERVISION OF NORMAL FIRST PREGNANCY IN THIRD TRIMESTER: Primary | ICD-10-CM

## 2018-04-16 DIAGNOSIS — Z23 NEED FOR TDAP VACCINATION: ICD-10-CM

## 2018-04-16 PROCEDURE — 99207 ZZC PRENATAL VISIT: CPT | Performed by: FAMILY MEDICINE

## 2018-04-16 PROCEDURE — 90715 TDAP VACCINE 7 YRS/> IM: CPT | Performed by: FAMILY MEDICINE

## 2018-04-16 ASSESSMENT — ANXIETY QUESTIONNAIRES
7. FEELING AFRAID AS IF SOMETHING AWFUL MIGHT HAPPEN: MORE THAN HALF THE DAYS
1. FEELING NERVOUS, ANXIOUS, OR ON EDGE: MORE THAN HALF THE DAYS
5. BEING SO RESTLESS THAT IT IS HARD TO SIT STILL: SEVERAL DAYS
2. NOT BEING ABLE TO STOP OR CONTROL WORRYING: MORE THAN HALF THE DAYS
3. WORRYING TOO MUCH ABOUT DIFFERENT THINGS: MORE THAN HALF THE DAYS
6. BECOMING EASILY ANNOYED OR IRRITABLE: NEARLY EVERY DAY
GAD7 TOTAL SCORE: 13
IF YOU CHECKED OFF ANY PROBLEMS ON THIS QUESTIONNAIRE, HOW DIFFICULT HAVE THESE PROBLEMS MADE IT FOR YOU TO DO YOUR WORK, TAKE CARE OF THINGS AT HOME, OR GET ALONG WITH OTHER PEOPLE: SOMEWHAT DIFFICULT

## 2018-04-16 ASSESSMENT — PATIENT HEALTH QUESTIONNAIRE - PHQ9: 5. POOR APPETITE OR OVEREATING: SEVERAL DAYS

## 2018-04-16 NOTE — MR AVS SNAPSHOT
After Visit Summary   4/16/2018    Jake Gordillo    MRN: [id number]           Patient Information     Date Of Birth          1995        Visit Information        Provider Department      4/16/2018 11:20 AM Kallie Goldberg MD [facility]        Today's Diagnoses     Encounter for supervision of normal first pregnancy in third trimester    -  1    Need for Tdap vaccination          Care Instructions    [facility] [phone] for concerns about pregnancy or Labor.      Next visit 2 weeks          Follow-ups after your visit        Your next 10 appointments already scheduled     May 07, 2018  3:40 PM CDT   ESTABLISHED PRENATAL with Kallie Goldberg MD   [facility] [place] ([facility])    [address]   [place] [address]   [phone]            May 21, 2018  6:20 PM CDT   ESTABLISHED PRENATAL with Kallie Goldberg MD   [facility]    [address]   [place] [address]   [phone]            May 30, 2018  3:55 PM CDT   ESTABLISHED PRENATAL with Kallie Goldberg MD   [facility] [place] ([facility])    [address]  [place] [address]   [phone]            Jun 05, 2018  3:40 PM CDT   ESTABLISHED PRENATAL with Kallie Goldberg MD   [facility]    [address]  [place] [address]   [phone]            Jun 12, 2018  3:40 PM CDT   ESTABLISHED PRENATAL with Kallie Goldberg MD   [facility] [place] ([facility])    [address]   [place] [address]   [phone]            Jun 18, 2018  3:40 PM CDT   ESTABLISHED PRENATAL with Kallie Goldberg MD   [facility]    [address]   [place] [address]   [phone]              Who to contact     If you have questions or need follow up information about  today's clinic visit or your schedule please contact [facility] directly at [phone].  Normal or non-critical lab and imaging results will be communicated to you by MyChart, letter or phone within 4 business days after the clinic has received the results. If you do not hear from us within 7 days, please contact the clinic through Kitengahart or phone. If you have a critical or abnormal lab result, we will notify you by phone as soon as possible.  Submit refill requests through [employer] or call your pharmacy and they will forward the refill request to us. Please allow 3 business days for your refill to be completed.          Additional Information About Your Visit        [email or web address] Information     [employer] gives you secure access to your electronic health record. If you see a primary care provider, you can also send messages to your care team and make appointments. If you have questions, please call your primary care clinic.  If you do not have a primary care provider, please call [phone] and they will assist you.        Care EveryWhere ID     This is your Care EveryWhere ID. This could be used by other organizations to access your [place] medical records  [id number]        Your Vitals Were     Pulse Temperature Last Period Pulse Oximetry BMI (Body Mass Index)       99 98.1  F (36.7  C) (Oral) 09/11/2017 97% 30.98 kg/m2        Blood Pressure from Last 3 Encounters:   04/16/18 128/82   03/19/18 127/72   02/12/18 130/68    Weight from Last 3 Encounters:   04/16/18 169 lb 6.4 oz (76.8 kg)   03/19/18 166 lb (75.3 kg)   02/12/18 153 lb 9.6 oz (69.7 kg)              Today, you had the following     No orders found for display       Primary Care Provider Office Phone # Fax #    Kallie Goldberg [phone] [address] [address] [facility] [address]        Equal Access to Services     OSKAR THIBODEAUX AH: Harmeet Hook, betina maya, willard carl  swetha bonillabaincacarissa mejiaAmirajohn ah. So [facility] [phone].    ATENCIÓN: Si habla toribio, tiene a kuo disposición servicios gratuitos de asistencia lingüística. Patricia al [phone].    We comply with applicable federal civil rights laws and [place] laws. We do not discriminate on the basis of race, color, national origin, age, disability, sex, sexual orientation, or gender identity.            Thank you!     Thank you for choosing [facility]  for your care. Our goal is always to provide you with excellent care. Hearing back from our patients is one way we can continue to improve our services. Please take a few minutes to complete the written survey that you may receive in the mail after your visit with us. Thank you!             Your Updated Medication List - Protect others around you: Learn how to safely use, store and throw away your medicines at www.disposemymeds.org.          This list is accurate as of 4/16/18 12:05 PM.  Always use your most recent med list.                   Brand Name Dispense Instructions for use Diagnosis    ferrous fumarate 65 mg ([place]. FE)-Vitamin C 125 mg  MG Tabs tablet    VITRON C    100 tablet    Take 1 tablet by mouth daily    Anemia of pregnancy in second trimester       ondansetron 4 MG ODT tab    ZOFRAN ODT    30 tablet    Take 1 tablet (4 mg) by mouth every 8 hours as needed for nausea    Nausea/vomiting in pregnancy       PRENATAL ADULT GUMMY/DHA/FA PO           sertraline 50 MG tablet    ZOLOFT    90 tablet    Take 1 tablet (50 mg) by mouth daily    Major depressive disorder, recurrent episode, moderate (H), Anxiety

## 2018-04-16 NOTE — NURSING NOTE
Screening Questionnaire for Adult Immunization    Are you sick today?   No   Do you have allergies to medications, food, a vaccine component or latex?   No   Have you ever had a serious reaction after receiving a vaccination?   No   Do you have a long-term health problem with heart disease, lung disease, asthma, kidney disease, metabolic disease (e.g. diabetes), anemia, or other blood disorder?   No   Do you have cancer, leukemia, HIV/AIDS, or any other immune system problem?   No   In the past 3 months, have you taken medications that affect  your immune system, such as prednisone, other steroids, or anticancer drugs; drugs for the treatment of rheumatoid arthritis, Crohn s disease, or psoriasis; or have you had radiation treatments?   No   Have you had a seizure, or a brain or other nervous system problem?   No   During the past year, have you received a transfusion of blood or blood     products, or been given immune (gamma) globulin or antiviral drug?   No   For women: Are you pregnant or is there a chance you could become        pregnant during the next month?   Yes   Have you received any vaccinations in the past 4 weeks?   No     Immunization questionnaire was positive for at least one answer.  Notified Dr Goldberg.        Per orders of Dr. Goldberg, injection of TDAP given by Judith Collado. Patient instructed to remain in clinic for 15 minutes afterwards, and to report any adverse reaction to me immediately.       Screening performed by Judith Collado on 4/16/2018 at 12:08 PM.

## 2018-04-17 ASSESSMENT — ANXIETY QUESTIONNAIRES: GAD7 TOTAL SCORE: 13

## 2018-04-17 ASSESSMENT — PATIENT HEALTH QUESTIONNAIRE - PHQ9: SUM OF ALL RESPONSES TO PHQ QUESTIONS 1-9: 12

## 2018-04-23 ENCOUNTER — MYC REFILL (OUTPATIENT)
Dept: FAMILY MEDICINE | Facility: CLINIC | Age: 23
End: 2018-04-23

## 2018-04-23 DIAGNOSIS — O21.9 NAUSEA/VOMITING IN PREGNANCY: ICD-10-CM

## 2018-04-23 RX ORDER — ONDANSETRON 4 MG/1
4 TABLET, ORALLY DISINTEGRATING ORAL EVERY 8 HOURS PRN
Qty: 30 TABLET | Refills: 1 | Status: SHIPPED | OUTPATIENT
Start: 2018-04-23 | End: 2018-08-28

## 2018-04-23 NOTE — TELEPHONE ENCOUNTER
Message from LightS[employer] Educationhart:  Original authorizing provider: MD Jake Casanova would like a refill of the following medications:  ondansetron (ZOFRAN ODT) 4 MG ODT tab [Kallie Goldberg MD]    Preferred pharmacy: [facility] [id number] [place] [id number] [facility]    Comment:

## 2018-04-24 ENCOUNTER — MYC MEDICAL ADVICE (OUTPATIENT)
Dept: FAMILY MEDICINE | Facility: CLINIC | Age: 23
End: 2018-04-24

## 2018-04-24 DIAGNOSIS — D64.9 ANEMIA: ICD-10-CM

## 2018-04-24 DIAGNOSIS — R42 DIZZINESS: ICD-10-CM

## 2018-04-24 DIAGNOSIS — D64.9 ANEMIA: Primary | ICD-10-CM

## 2018-04-24 LAB
DIFFERENTIAL METHOD BLD: ABNORMAL
EOSINOPHIL # BLD AUTO: 0.2 10E9/L (ref 0–0.7)
EOSINOPHIL NFR BLD AUTO: 1 %
ERYTHROCYTE [DISTWIDTH] IN BLOOD BY AUTOMATED COUNT: 13.8 % (ref 10–15)
HCT VFR BLD AUTO: 31.9 % (ref 35–47)
HGB BLD-MCNC: 10.3 G/DL (ref 11.7–15.7)
HYPOCHROMIA BLD QL: PRESENT
LYMPHOCYTES # BLD AUTO: 2.4 10E9/L (ref 0.8–5.3)
LYMPHOCYTES NFR BLD AUTO: 14 %
MCH RBC QN AUTO: 27.6 PG (ref 26.5–33)
MCHC RBC AUTO-ENTMCNC: 32.3 G/DL (ref 31.5–36.5)
MCV RBC AUTO: 86 FL (ref 78–100)
MONOCYTES # BLD AUTO: 1.2 10E9/L (ref 0–1.3)
MONOCYTES NFR BLD AUTO: 7 %
NEUTROPHILS # BLD AUTO: 13.6 10E9/L (ref 1.6–8.3)
NEUTROPHILS NFR BLD AUTO: 78 %
PLATELET # BLD AUTO: 344 10E9/L (ref 150–450)
PLATELET # BLD EST: ABNORMAL 10*3/UL
RBC # BLD AUTO: 3.73 10E12/L (ref 3.8–5.2)
VARIANT LYMPHS BLD QL SMEAR: PRESENT
WBC # BLD AUTO: 17.4 10E9/L (ref 4–11)

## 2018-04-24 PROCEDURE — 36415 COLL VENOUS BLD VENIPUNCTURE: CPT | Performed by: FAMILY MEDICINE

## 2018-04-24 PROCEDURE — 85025 COMPLETE CBC W/AUTO DIFF WBC: CPT | Performed by: FAMILY MEDICINE

## 2018-04-24 NOTE — TELEPHONE ENCOUNTER
Lab Results   Component Value Date    WBC 15.9 03/19/2018     Lab Results   Component Value Date    RBC 3.45 03/19/2018     Lab Results   Component Value Date    HGB 9.9 03/19/2018     Lab Results   Component Value Date    HCT 30.9 03/19/2018     No components found for: MCT  Lab Results   Component Value Date    MCV 90 03/19/2018     Lab Results   Component Value Date    MCH 28.7 03/19/2018     Lab Results   Component Value Date    MCHC 32.0 03/19/2018     Lab Results   Component Value Date    RDW 13.3 03/19/2018     Lab Results   Component Value Date     03/19/2018

## 2018-04-25 ENCOUNTER — TELEPHONE (OUTPATIENT)
Dept: FAMILY MEDICINE | Facility: CLINIC | Age: 23
End: 2018-04-25

## 2018-04-25 NOTE — TELEPHONE ENCOUNTER
Reason for Call:  Form, our goal is to have forms completed with 72 hours, however, some forms may require a visit or additional information.    Type of letter, form or note:  FMLA    Who is the form from?: Patient    Where did the form come from: Patient or family brought in       What clinic location was the form placed at?: [place]    Where the form was placed: 's Box    What number is listed as a contact on the form?: [phone]       Additional comments: call the patient when ready for     Call taken on 4/25/2018 at 12:59 PM by Yara Lord

## 2018-04-25 NOTE — TELEPHONE ENCOUNTER
Forms to be filled out placed in providers basket. Route back to team when complete.  STEVEN Chin

## 2018-05-02 ENCOUNTER — MYC MEDICAL ADVICE (OUTPATIENT)
Dept: FAMILY MEDICINE | Facility: CLINIC | Age: 23
End: 2018-05-02

## 2018-05-07 ENCOUNTER — PRENATAL OFFICE VISIT (OUTPATIENT)
Dept: FAMILY MEDICINE | Facility: CLINIC | Age: 23
End: 2018-05-07
Payer: COMMERCIAL

## 2018-05-07 VITALS
RESPIRATION RATE: 16 BRPM | SYSTOLIC BLOOD PRESSURE: 140 MMHG | HEART RATE: 120 BPM | WEIGHT: 175 LBS | DIASTOLIC BLOOD PRESSURE: 86 MMHG | BODY MASS INDEX: 32.01 KG/M2 | TEMPERATURE: 98.1 F

## 2018-05-07 DIAGNOSIS — Z34.03 ENCOUNTER FOR SUPERVISION OF NORMAL FIRST PREGNANCY IN THIRD TRIMESTER: Primary | ICD-10-CM

## 2018-05-07 DIAGNOSIS — R03.0 ELEVATED BLOOD PRESSURE READING WITHOUT DIAGNOSIS OF HYPERTENSION: ICD-10-CM

## 2018-05-07 PROCEDURE — 99207 ZZC PRENATAL VISIT: CPT | Performed by: FAMILY MEDICINE

## 2018-05-07 NOTE — MR AVS SNAPSHOT
After Visit Summary   5/7/2018    Jake Gordillo    MRN: [id number]           Patient Information     Date Of Birth          1995        Visit Information        Provider Department      5/7/2018 3:40 PM Kallie Goldberg MD [facility]        Care Instructions    Report to or call [facility] [phone] for concerns about pregnancy or Labor.      Next visit 2 weeks then weekly.          Follow-ups after your visit        Your next 10 appointments already scheduled     May 21, 2018  6:20 PM CDT   ESTABLISHED PRENATAL with Kallie Goldberg MD   [facility]    [address]   [place] [address]   [phone]            May 30, 2018  3:55 PM CDT   ESTABLISHED PRENATAL with Kallie Goldberg MD   [facility]    [address]   [place] [address]   [phone]            Jun 05, 2018  3:40 PM CDT   ESTABLISHED PRENATAL with Kallie Goldberg MD   [facility]    [address]   [place] [address]   [phone]            Jun 12, 2018  3:40 PM CDT   ESTABLISHED PRENATAL with Kallie Goldberg MD   [facility]    [address]   [place] [address]   [phone]            Jun 18, 2018  3:40 PM CDT   ESTABLISHED PRENATAL with Kallie Goldberg MD   [facility]    [address] [facility] [address] [phone]              Who to contact     If you have questions or need follow up information about today's clinic visit or your schedule please contact [facility] directly at [phone].  Normal or non-critical lab and imaging results will be communicated to you by MyChart, letter or phone within 4 business days after the clinic has received the results. If you do not hear from us within 7 days, please contact  the clinic through Heartbeatt or phone. If you have a critical or abnormal lab result, we will notify you by phone as soon as possible.  Submit refill requests through TASS or call your pharmacy and they will forward the refill request to us. Please allow 3 business days for your refill to be completed.          Additional Information About Your Visit        Kadoinkhart Information     TASS gives you secure access to your electronic health record. If you see a primary care provider, you can also send messages to your care team and make appointments. If you have questions, please call your primary care clinic.  If you do not have a primary care provider, please call [phone] and they will assist you.        Care EveryWhere ID     This is your Care EveryWhere ID. This could be used by other organizations to access your [place] medical records  [id number]        Your Vitals Were     Pulse Temperature Respirations Last Period BMI (Body Mass Index)       120 98.1  F (36.7  C) (Oral) 16 09/11/2017 32.01 kg/m2        Blood Pressure from Last 3 Encounters:   05/07/18 151/85   04/16/18 128/82   03/19/18 127/72    Weight from Last 3 Encounters:   05/07/18 175 lb (79.4 kg)   04/16/18 169 lb 6.4 oz (76.8 kg)   03/19/18 166 lb (75.3 kg)              Today, you had the following     No orders found for display       Primary Care Provider Office Phone # Fax #    Kallie Mari Goldberg [phone] [address] [facility] [address]        Equal Access to Services     CHRISTIANO THIBODEAUX AH: Hadii aad ku hadasho Soomaali, waaxda luqadaha, qaybta kaalmada adeegyada, willard galdamez . So [facility] [phone].    ATENCIÓN: Si habla español, tiene a kuo disposición servicios gratuitos de asistencia lingüística. Llame al [phone].    We comply with applicable federal civil rights laws and [place] laws. We do not discriminate on the basis of race, color, national origin, age, disability,  sex, sexual orientation, or gender identity.            Thank you!     [facility] you [facility]  [facility]. Our goal is always to provide you with excellent care. Hearing back from our patients is one way we can continue to improve our services. Please take a few minutes to complete the written survey that you may receive in the mail after your visit with us. Thank you!             Your Updated Medication List - Protect others around you: Learn how to safely use, store and throw away your medicines at www.disposemymeds.org.          This list is accurate as of 5/7/18  4:12 PM.  Always use your most recent med list.                   Brand Name Dispense Instructions for use Diagnosis    ferrous fumarate 65 mg ([place]. FE)-Vitamin C 125 mg  MG Tabs tablet    VITRON C    100 tablet    Take 1 tablet by mouth daily    Anemia of pregnancy in second trimester       ondansetron 4 MG ODT tab    ZOFRAN ODT    30 tablet    Take 1 tablet (4 mg) by mouth every 8 hours as needed for nausea    Nausea/vomiting in pregnancy       PRENATAL ADULT GUMMY/DHA/FA PO           sertraline 50 MG tablet    ZOLOFT    90 tablet    Take 1 tablet (50 mg) by mouth daily    Major depressive disorder, recurrent episode, moderate (H), Anxiety

## 2018-05-07 NOTE — PROGRESS NOTES
Doing well.  No concerns today. Stopped zoloft due to jaw clenching, feels mood is doing well.  GBS next visit.  Prenatal flowsheet information is reviewed.  Discussed kick counts and fetal movement.  Reportable signs and symptoms discussed.  BP remains slightly elevated  Will send to L&D for serial bp and labs.    F/u 2 wks if bp normalizes.

## 2018-05-07 NOTE — NURSING NOTE
"Chief Complaint   Patient presents with     Prenatal Care       Initial /85  Pulse 120  Temp 98.1  F (36.7  C) (Oral)  Resp 16  Wt 175 lb (79.4 kg)  LMP 09/11/2017  BMI 32.01 kg/m2 Estimated body mass index is 32.01 kg/(m^2) as calculated from the following:    Height as of 3/19/18: 5' 2\" (1.575 m).    Weight as of this encounter: 175 lb (79.4 kg).  Medication Reconciliation: complete  Sher Boyd CMA    "

## 2018-05-08 ENCOUNTER — TELEPHONE (OUTPATIENT)
Dept: FAMILY MEDICINE | Facility: CLINIC | Age: 23
End: 2018-05-08

## 2018-05-08 NOTE — TELEPHONE ENCOUNTER
Patient is calling stating was in but sent to ER yesterday for high BP, would like to be fit into providers schedule to be seen for f/u for her bp. Please call to discuss. Thank  You.

## 2018-05-08 NOTE — TELEPHONE ENCOUNTER
Information below is reviewed with  Dr Kallie Goldberg, Verbal Orders, please schedule patient in a team slot, tomorrow or Friday.   Left message on answering machine for patient/parent to call back.   [phone].  Jahaira Mena RN

## 2018-05-09 NOTE — TELEPHONE ENCOUNTER
"Appointment scheduled Friday in team slot at 2:35pm at [facility] with Dr. Goldberg per note below. ED f/u call also completed, see below.      Hospital/TCU/ED for chronic condition Discharge Protocol    How things are going for you after your recent emergency visit?\"    Tell me how you are doing now that you are home?\" Doing well, no new sx.      Discharge Instructions    \"Let's review your discharge instructions.  What is/are the follow-up recommendations?  Pt. Response: f/u with PCP, watch for signs of premature labor.    \"Has an appointment with your primary care provider been scheduled?\"   Yes. (confirm)    \"When you see the provider, I would recommend that you bring your medications with you.\"    Medications    \"Tell me what changed about your medicines when you discharged?\"    Changes to chronic meds?    0-1    \"What questions do you have about your medications?\"    None     New diagnoses of heart failure, COPD, diabetes, or MI?    No              Medication reconciliation completed? No, due to no new medications  Was MTM referral placed (*Make sure to put transitions as reason for referral)?   No    Call Summary    \"What questions or concerns do you have about your recent visit and your follow-up care?\"     None. Advice given: In addition to signs of premature labor to monitor for as discussed at ED, return to ED for signs of preeclampsia/eclampsia such as HA, CP, SOB, blurred vision, RUQ pain, epigastric pain.    \"If you have questions or things don't continue to improve, we encourage you contact us through the main clinic number (give number).  Even if the clinic is not open, triage nurses are available 24/7 to help you.     We would like you to know that our clinic has extended hours (provide information).  We also have urgent care (provide details on closest location and hours/contact info)\"      \"Thank you for your time and take care!\"       Razia Ceja RN        "

## 2018-05-11 ENCOUNTER — OFFICE VISIT (OUTPATIENT)
Dept: FAMILY MEDICINE | Facility: CLINIC | Age: 23
End: 2018-05-11
Payer: COMMERCIAL

## 2018-05-11 VITALS
WEIGHT: 175.4 LBS | RESPIRATION RATE: 16 BRPM | TEMPERATURE: 97.5 F | DIASTOLIC BLOOD PRESSURE: 87 MMHG | BODY MASS INDEX: 32.08 KG/M2 | SYSTOLIC BLOOD PRESSURE: 143 MMHG | HEART RATE: 92 BPM

## 2018-05-11 DIAGNOSIS — Z34.03 ENCOUNTER FOR SUPERVISION OF NORMAL FIRST PREGNANCY IN THIRD TRIMESTER: Primary | ICD-10-CM

## 2018-05-11 PROCEDURE — 99207 ZZC COMPLICATED OB VISIT: CPT | Performed by: FAMILY MEDICINE

## 2018-05-11 NOTE — MR AVS SNAPSHOT
After Visit Summary   5/11/2018    Jake Gordillo    MRN: [id number]           Patient Information     Date Of Birth          1995        Visit Information        Provider Department      5/11/2018 2:35 PM Kallie Goldberg MD [facility]        Today's Diagnoses     Encounter for supervision of normal first pregnancy in third trimester    -  1       Follow-ups after your visit        Your next 10 appointments already scheduled     May 30, 2018  3:55 PM CDT   ESTABLISHED PRENATAL with Kallie Goldberg MD   [facility]    [address] [facility] [address]   [phone]            Jun 05, 2018  3:40 PM CDT   ESTABLISHED PRENATAL with Kallie Goldberg MD   [facility] ([facility])    [address] [facility] [address]   [phone]            Jun 12, 2018  3:40 PM CDT   ESTABLISHED PRENATAL with Kallie Goldberg MD   [facility] ([facility])    [address] [facility] [address]   [phone]            Jun 18, 2018  3:40 PM CDT   ESTABLISHED PRENATAL with Kallie Goldberg MD   [facility] ([facility])    [address] [facility] [address] [phone]              Who to contact     If you have questions or need follow up information about today's clinic visit or your schedule please contact [facility] directly at [phone].  Normal or non-critical lab and imaging results will be communicated to you by MyChart, letter or phone within 4 business days after the clinic has received the results. If you do not hear from us within 7 days, please contact the clinic through Assisterahart or phone. If you have a critical or abnormal lab result, we will notify you by phone as soon as possible.  Submit refill requests through [employer] or call your pharmacy and they will forward the refill request to us.  Please allow 3 business days for your refill to be completed.          Additional Information About Your Visit        MyChart Information     [employer] gives you secure access to your electronic health record. If you see a primary care provider, you can also send messages to your care team and make appointments. If you have questions, please call your primary care clinic.  If you do not have a primary care provider, please call [phone] and they will assist you.        Care EveryWhere ID     This is your Care EveryWhere ID. This could be used by other organizations to access your [place] medical records  [id number]        Your Vitals Were     Pulse Temperature Respirations Last Period BMI (Body Mass Index)       92 97.5  F (36.4  C) (Oral) 16 09/11/2017 32.08 kg/m2        Blood Pressure from Last 3 Encounters:   05/21/18 116/71   05/11/18 143/87   05/07/18 140/86    Weight from Last 3 Encounters:   05/21/18 177 lb (80.3 kg)   05/11/18 175 lb 6.4 oz (79.6 kg)   05/07/18 175 lb (79.4 kg)              Today, you had the following     No orders found for display       Primary Care Provider Office Phone # Fax #    Kallie Mari Goldberg [phone] [address] [facility] [address]        Equal Access to Services     OSKAR THIBODEAUX : Hadii aad ku hadasho Soomaali, waaxda luqadaha, qaybta kaalmada adeegyada, willard idiin hayantonion olivia galdamez . So [facility] [phone].    ATENCIÓN: Si habla español, tiene a kuo disposición servicios gratuitos de asistencia lingüística. Llame al [phone].    We comply with applicable federal civil rights laws and [place] laws. We do not discriminate on the basis of race, color, national origin, age, disability, sex, sexual orientation, or gender identity.            Thank you!     Thank you for choosing [facility]  for your care. Our goal is always to provide you with excellent care. Hearing back from our patients is one way we can  continue to improve our services. Please take a few minutes to complete the written survey that you may receive in the mail after your visit with us. Thank you!             Your Updated Medication List - Protect others around you: Learn how to safely use, store and throw away your medicines at www.disposemymeds.org.          This list is accurate as of 5/11/18 11:59 PM.  Always use your most recent med list.                   Brand Name Dispense Instructions for use Diagnosis    ferrous fumarate 65 mg ([place]. FE)-Vitamin C 125 mg  MG Tabs tablet    VITRON C    100 tablet    Take 1 tablet by mouth daily    Anemia of pregnancy in second trimester       ondansetron 4 MG ODT tab    ZOFRAN ODT    30 tablet    Take 1 tablet (4 mg) by mouth every 8 hours as needed for nausea    Nausea/vomiting in pregnancy       PRENATAL ADULT GUMMY/DHA/FA PO           sertraline 50 MG tablet    ZOLOFT    90 tablet    Take 1 tablet (50 mg) by mouth daily    Major depressive disorder, recurrent episode, moderate (H), Anxiety

## 2018-05-11 NOTE — NURSING NOTE
"Chief Complaint   Patient presents with     Prenatal Care     had one episode of feeling like heart was racing, some dizziness, headaches often        Initial /87  Pulse 92  Temp 97.5  F (36.4  C) (Oral)  Resp 16  Wt 175 lb 6.4 oz (79.6 kg)  LMP 09/11/2017  BMI 32.08 kg/m2 Estimated body mass index is 32.08 kg/(m^2) as calculated from the following:    Height as of 3/19/18: 5' 2\" (1.575 m).    Weight as of this encounter: 175 lb 6.4 oz (79.6 kg).  Medication Reconciliation: complete  Sher Boyd CMA    "

## 2018-05-21 ENCOUNTER — PRENATAL OFFICE VISIT (OUTPATIENT)
Dept: FAMILY MEDICINE | Facility: CLINIC | Age: 23
End: 2018-05-21
Payer: COMMERCIAL

## 2018-05-21 VITALS
TEMPERATURE: 98 F | DIASTOLIC BLOOD PRESSURE: 71 MMHG | WEIGHT: 177 LBS | SYSTOLIC BLOOD PRESSURE: 116 MMHG | HEART RATE: 101 BPM | BODY MASS INDEX: 32.37 KG/M2 | RESPIRATION RATE: 18 BRPM

## 2018-05-21 DIAGNOSIS — Z34.03 ENCOUNTER FOR SUPERVISION OF NORMAL FIRST PREGNANCY IN THIRD TRIMESTER: Primary | ICD-10-CM

## 2018-05-21 PROCEDURE — 99207 ZZC COMPLICATED OB VISIT: CPT | Performed by: FAMILY MEDICINE

## 2018-05-21 PROCEDURE — 87653 STREP B DNA AMP PROBE: CPT | Performed by: FAMILY MEDICINE

## 2018-05-21 NOTE — MR AVS SNAPSHOT
After Visit Summary   5/21/2018    Jake Gordillo    MRN: [id number]           Patient Information     Date Of Birth          1995        Visit Information        Provider Department      5/21/2018 6:20 PM Kallie Goldberg MD [facility]        Today's Diagnoses     Encounter for supervision of normal first pregnancy in third trimester    -  1      Care Instructions    [facility] [phone] for concerns about pregnancy or Labor.    Weekly visits          Follow-ups after your visit        Your next 10 appointments already scheduled     May 30, 2018  3:55 PM CDT   ESTABLISHED PRENATAL with Kallie Goldberg MD   [facility] ([facility]    [address] [facility]  [place] [address] [phone]            Jun 05, 2018  3:40 PM CDT   ESTABLISHED PRENATAL with Kallie Goldberg MD   [facility] ([facility])    [address]   [place] [address] [phone]            Jun 12, 2018  3:40 PM CDT   ESTABLISHED PRENATAL with Kallie Goldberg MD   [facility] ([facility])    [address] [facility]  [place] [address] [phone]            Jun 18, 2018  3:40 PM CDT   ESTABLISHED PRENATAL with Kallie Goldberg MD   [facility]    [address] [facility]  [place] [address] [phone]              Who to contact     If you have questions or need follow up information about today's clinic visit or your schedule please contact [facility] directly at [phone].  Normal or non-critical lab and imaging results will be communicated to you by MyChart, letter or phone within 4 business days after the clinic has received the results. If you do not hear from us within 7 days, please contact the clinic through MyChart or phone. If you have a critical or abnormal lab result, we will notify you by phone as soon  as possible.  Submit refill requests through Met[employer]om[employer] or call your pharmacy and they will forward the refill request to us. Please allow 3 business days for your refill to be completed.          Additional Information About Your Visit        [employer]ovo[employer] Information     Met[employer] gives you secure access to your electronic health record. If you see a primary care provider, you can also send messages to your care team and make appointments. If you have questions, please call your primary care clinic.  If you do not have a primary care provider, please call [phone] and they will assist you.        Care EveryWhere ID     This is your Care EveryWhere ID. This could be used by other organizations to access your [place] medical records  [id number]        Your Vitals Were     Pulse Temperature Respirations Last Period BMI (Body Mass Index)       131 98  F (36.7  C) (Oral) 18 09/11/2017 32.37 kg/m2        Blood Pressure from Last 3 Encounters:   05/21/18 155/86   05/11/18 143/87   05/07/18 140/86    Weight from Last 3 Encounters:   05/21/18 177 lb (80.3 kg)   05/11/18 175 lb 6.4 oz (79.6 kg)   05/07/18 175 lb (79.4 kg)              We Performed the Following     Group B strep PCR        Primary Care Provider Office Phone # Fax #    Kallie Mari Goldberg [phone] [address] [facility] [address]        Equal Access to Services     OSKAR THIBODEAUX : Hadii mansi guajardo hadasho Sokush, waaxda luqadaha, qaybta kaalmada ramya, willard galdamez . So [facility] [phone].    ATENCIÓN: Si habla español, tiene a kuo disposición servicios gratuitos de asistencia lingüística. Llame al [phone].    We comply with applicable federal civil rights laws and [place] laws. We do not discriminate on the basis of race, color, national origin, age, disability, sex, sexual orientation, or gender identity.            Thank you!     Thank you for choosing [facility]  for your care.  Our goal is always to provide you with excellent care. Hearing back from our patients is one way we can continue to improve our services. Please take a few minutes to complete the written survey that you may receive in the mail after your visit with us. Thank you!             Your Updated Medication List - Protect others around you: Learn how to safely use, store and throw away your medicines at www.disposemymeds.org.          This list is accurate as of 5/21/18  7:01 PM.  Always use your most recent med list.                   Brand Name Dispense Instructions for use Diagnosis    ferrous fumarate 65 mg ([place]. FE)-Vitamin C 125 mg  MG Tabs tablet    VITRON C    100 tablet    Take 1 tablet by mouth daily    Anemia of pregnancy in second trimester       ondansetron 4 MG ODT tab    ZOFRAN ODT    30 tablet    Take 1 tablet (4 mg) by mouth every 8 hours as needed for nausea    Nausea/vomiting in pregnancy       PRENATAL ADULT GUMMY/DHA/FA PO           sertraline 50 MG tablet    ZOLOFT    90 tablet    Take 1 tablet (50 mg) by mouth daily    Major depressive disorder, recurrent episode, moderate (H), Anxiety

## 2018-05-21 NOTE — NURSING NOTE
"Chief Complaint   Patient presents with     Prenatal Care     yesterday ankles swelled up- resolved after going to sleep. some cramping        Initial /86  Pulse 131  Temp 98  F (36.7  C) (Oral)  Resp 18  Wt 177 lb (80.3 kg)  LMP 09/11/2017  BMI 32.37 kg/m2 Estimated body mass index is 32.37 kg/(m^2) as calculated from the following:    Height as of 3/19/18: 5' 2\" (1.575 m).    Weight as of this encounter: 177 lb (80.3 kg).  Medication Reconciliation: complete  Sher Boyd CMA    "

## 2018-05-21 NOTE — PROGRESS NOTES
Doing well.  No concerns today.  Cervix is posterior, long, closed and soft.  Cervix check next visit.  Cephalic position confirmed by Leopold maneuvers and cervical exam.  GBS done today.  Prenatal flowsheet information is reviewed.  Discussed signs of labor and when to call or come in.  Discussed kick counts and fetal movement.  Reportable signs and symptoms discussed.  Weekly f/u

## 2018-05-21 NOTE — PATIENT INSTRUCTIONS
Report to or call [facility] L&THERESA [phone] for concerns about pregnancy or Labor.    Weekly visits

## 2018-05-22 NOTE — PROGRESS NOTES
Seen for f/u of bp visit to L&D,   bp readings looked great  Pre-eclampsia labs normal  Repeat bp 123/76    Will see weekly until delivery, consider labetalol if persistent elevated bp.

## 2018-05-23 LAB
GP B STREP DNA SPEC QL NAA+PROBE: NEGATIVE
SPECIMEN SOURCE: NORMAL

## 2018-05-30 ENCOUNTER — PRENATAL OFFICE VISIT (OUTPATIENT)
Dept: FAMILY MEDICINE | Facility: CLINIC | Age: 23
End: 2018-05-30
Payer: COMMERCIAL

## 2018-05-30 VITALS
SYSTOLIC BLOOD PRESSURE: 118 MMHG | HEART RATE: 86 BPM | WEIGHT: 181 LBS | DIASTOLIC BLOOD PRESSURE: 69 MMHG | RESPIRATION RATE: 16 BRPM | BODY MASS INDEX: 33.11 KG/M2 | OXYGEN SATURATION: 98 %

## 2018-05-30 DIAGNOSIS — Z34.03 ENCOUNTER FOR SUPERVISION OF NORMAL FIRST PREGNANCY IN THIRD TRIMESTER: Primary | ICD-10-CM

## 2018-05-30 PROCEDURE — 99207 ZZC PRENATAL VISIT: CPT | Performed by: FAMILY MEDICINE

## 2018-05-30 NOTE — PATIENT INSTRUCTIONS
[facility] [phone] for concerns about pregnancy or Labor.      Weekly visits

## 2018-05-30 NOTE — MR AVS SNAPSHOT
After Visit Summary   5/30/2018    Jake Gordillo    MRN: [id number]           Patient Information     Date Of Birth          1995        Visit Information        Provider Department      5/30/2018 3:55 PM Kallie Goldberg MD [facility]        Care Instructions    Report to or call [facility] [phone] for concerns about pregnancy or Labor.      Weekly visits          Follow-ups after your visit        Your next 10 appointments already scheduled     Jun 05, 2018  3:40 PM CDT   ESTABLISHED PRENATAL with Kallie Goldberg MD   [facility]    [address] [address] [phone]            Jun 12, 2018  3:40 PM CDT   ESTABLISHED PRENATAL with Kallie Goldberg MD   [facility] ([facility]    [address] [facility] [address] [phone]            Jun 18, 2018  3:40 PM CDT   ESTABLISHED PRENATAL with Kallie Goldberg MD   [facility] ([facility])    [address] [facility] [address] [phone]              Who to contact     If you have questions or need follow up information about today's clinic visit or your schedule please contact [facility] directly at [phone].  Normal or non-critical lab and imaging results will be communicated to you by MyChart, letter or phone within 4 business days after the clinic has received the results. If you do not hear from us within 7 days, please contact the clinic through MyChart or phone. If you have a critical or abnormal lab result, we will notify you by phone as soon as possible.  Submit refill requests through [facility] or call your pharmacy and they will forward the refill request to us. Please allow 3 business days for your refill to be completed.          Additional Information About Your Visit        StormPinshart Information     [facility] gives you secure access to your  electronic health record. If you see a primary care provider, you can also send messages to your care team and make appointments. If you have questions, please call your primary care clinic.  If you do not have a primary care provider, please call [phone] and they will assist you.        Care EveryWhere ID     This is your Care EveryWhere ID. This could be used by other organizations to access your [place] medical records  [id number]        Your Vitals Were     Pulse Respirations Last Period Pulse Oximetry BMI (Body Mass Index)       86 16 09/11/2017 98% 33.11 kg/m2        Blood Pressure from Last 3 Encounters:   05/30/18 118/69   05/21/18 116/71   05/11/18 143/87    Weight from Last 3 Encounters:   05/30/18 181 lb (82.1 kg)   05/21/18 177 lb (80.3 kg)   05/11/18 175 lb 6.4 oz (79.6 kg)              Today, you had the following     No orders found for display       Primary Care Provider Office Phone # Fax #    Kallie Mari Goldberg [phone] [address] [facility] [address]        Equal Access to Services     [facility]: Hadii aad ku hadasho Somartineali, waaxda luqadaha, qaybta kaalmada adeegyada, willard galdamez . So [facility] [phone].    ATENCIÓN: Si habla español, tiene a kuo disposición servicios gratuitos de asistencia lingüística. Llame al [phone].    We comply with applicable federal civil rights laws and [place] laws. We do not discriminate on the basis of race, color, national origin, age, disability, sex, sexual orientation, or gender identity.            Thank you!     Thank you for choosing [facility]  for your care. Our goal is always to provide you with excellent care. Hearing back from our patients is one way we can continue to improve our services. Please take a few minutes to complete the written survey that you may receive in the mail after your visit with us. Thank you!             Your Updated Medication List -  Protect others around you: Learn how to safely use, store and throw away your medicines at www.disposemymeds.org.          This list is accurate as of 5/30/18  4:13 PM.  Always use your most recent med list.                   Brand Name Dispense Instructions for use Diagnosis    ferrous fumarate 65 mg ([place]. FE)-Vitamin C 125 mg  MG Tabs tablet    VITRON C    100 tablet    Take 1 tablet by mouth daily    Anemia of pregnancy in second trimester       ondansetron 4 MG ODT tab    ZOFRAN ODT    30 tablet    Take 1 tablet (4 mg) by mouth every 8 hours as needed for nausea    Nausea/vomiting in pregnancy       PRENATAL ADULT GUMMY/DHA/FA PO           sertraline 50 MG tablet    ZOLOFT    90 tablet    Take 1 tablet (50 mg) by mouth daily    Major depressive disorder, recurrent episode, moderate (H), Anxiety

## 2018-05-30 NOTE — NURSING NOTE
"Chief Complaint   Patient presents with     Prenatal Care     Cramping the last two days, baby moving well        Initial /84  Pulse 86  Resp 16  Wt 181 lb (82.1 kg)  LMP 09/11/2017  SpO2 98%  BMI 33.11 kg/m2 Estimated body mass index is 33.11 kg/(m^2) as calculated from the following:    Height as of 3/19/18: 5' 2\" (1.575 m).    Weight as of this encounter: 181 lb (82.1 kg).  Medication Reconciliation: complete  Sher Boyd CMA    "

## 2018-05-30 NOTE — PROGRESS NOTES
Doing well.  No concerns today.  Cervix check next visit.  GBS neg  Prenatal flowsheet information is reviewed.  Discussed signs of labor and when to call or come in.  Discussed kick counts and fetal movement.  Reportable signs and symptoms discussed.  Weekly visits

## 2018-06-05 ENCOUNTER — PRENATAL OFFICE VISIT (OUTPATIENT)
Dept: FAMILY MEDICINE | Facility: CLINIC | Age: 23
End: 2018-06-05
Payer: COMMERCIAL

## 2018-06-05 VITALS
BODY MASS INDEX: 33.51 KG/M2 | RESPIRATION RATE: 16 BRPM | TEMPERATURE: 97.8 F | DIASTOLIC BLOOD PRESSURE: 68 MMHG | HEART RATE: 66 BPM | SYSTOLIC BLOOD PRESSURE: 126 MMHG | WEIGHT: 183.2 LBS

## 2018-06-05 DIAGNOSIS — Z34.03 ENCOUNTER FOR SUPERVISION OF NORMAL FIRST PREGNANCY IN THIRD TRIMESTER: Primary | ICD-10-CM

## 2018-06-05 PROCEDURE — 99207 ZZC PRENATAL VISIT: CPT | Performed by: FAMILY MEDICINE

## 2018-06-05 NOTE — PROGRESS NOTES
Doing well.  No concerns today.  Cervix check next visit.  GBS neg.  Prenatal flowsheet information is reviewed.  Discussed signs of labor and when to call or come in.  Discussed kick counts and fetal movement.  Reportable signs and symptoms discussed.  Declines cx exam today  Weekly visits

## 2018-06-05 NOTE — MR AVS SNAPSHOT
After Visit Summary   6/5/2018    Jake Gordillo    MRN: [id number]           Patient Information     Date Of Birth          1995        Visit Information        Provider Department      6/5/2018 3:40 PM Kallie Goldberg MD [facility]        Today's Diagnoses     Encounter for supervision of normal first pregnancy in third trimester    -  1      Care Instructions    Report to or call [facility] [phone] for concerns about pregnancy or Labor.      Weekly visits          Follow-ups after your visit        Your next 10 appointments already scheduled     Jun 12, 2018  3:40 PM CDT   ESTABLISHED PRENATAL with Kallie Goldberg MD   [facility]    [address] [facility] [address] [phone]            Jun 18, 2018  3:40 PM CDT   ESTABLISHED PRENATAL with Kallie Goldberg MD   [facility]    [address] [facility] [address] [phone]              Who to contact     If you have questions or need follow up information about today's clinic visit or your schedule please contact [facility] [place] directly at [phone].  Normal or non-critical lab and imaging results will be communicated to you by MyChart, letter or phone within 4 business days after the clinic has received the results. If you do not hear from us within 7 days, please contact the clinic through Readmillhart or phone. If you have a critical or abnormal lab result, we will notify you by phone as soon as possible.  Submit refill requests through [employer] or call your pharmacy and they will forward the refill request to us. Please allow 3 business days for your refill to be completed.          Additional Information About Your Visit        MyChart Information     [employer] gives you secure access to your electronic health record. If you see a primary care provider, you can also send messages to your care  team and make appointments. If you have questions, please call your primary care clinic.  If you do not have a primary care provider, please call [phone] and they will assist you.        Care EveryWhere ID     This is your Care EveryWhere ID. This could be used by other organizations to access your [place] medical records  [id number]        Your Vitals Were     Pulse Temperature Respirations Last Period BMI (Body Mass Index)       102 97.8  F (36.6  C) (Oral) 16 09/11/2017 33.51 kg/m2        Blood Pressure from Last 3 Encounters:   06/05/18 154/88   05/30/18 118/69   05/21/18 116/71    Weight from Last 3 Encounters:   06/05/18 183 lb 3.2 oz (83.1 kg)   05/30/18 181 lb (82.1 kg)   05/21/18 177 lb (80.3 kg)              Today, you had the following     No orders found for display       Primary Care Provider Office Phone # Fax #    Kallie Mari Goldberg [phone] [address] [facility] [address]        Equal Access to Services     [facility] : Hadii mansi ku hadasho Sokush, waaxda luqadaha, qaybta kaalmada ramya, willard galdamez . So [place] [phone].    ATENCIÓN: Si habla español, tiene a kuo disposición servicios gratuitos de asistencia lingüística. [facility] [phone].    We comply with applicable federal civil rights laws and [place] laws. We do not discriminate on the basis of race, color, national origin, age, disability, sex, sexual orientation, or gender identity.            Thank you!     Thank you for choosing [facility]  for your care. Our goal is always to provide you with excellent care. Hearing back from our patients is one way we can continue to improve our services. Please take a few minutes to complete the written survey that you may receive in the mail after your visit with us. Thank you!             Your Updated Medication List - Protect others around you: Learn how to safely use, store and throw away your  medicines at www.disposemymeds.org.          This list is accurate as of 6/5/18  4:10 PM.  Always use your most recent med list.                   Brand Name Dispense Instructions for use Diagnosis    ferrous fumarate 65 mg ([place]. FE)-Vitamin C 125 mg  MG Tabs tablet    VITRON C    100 tablet    Take 1 tablet by mouth daily    Anemia of pregnancy in second trimester       ondansetron 4 MG ODT tab    ZOFRAN ODT    30 tablet    Take 1 tablet (4 mg) by mouth every 8 hours as needed for nausea    Nausea/vomiting in pregnancy       PRENATAL ADULT GUMMY/DHA/FA PO           sertraline 50 MG tablet    ZOLOFT    90 tablet    Take 1 tablet (50 mg) by mouth daily    Major depressive disorder, recurrent episode, moderate (H), Anxiety

## 2018-06-05 NOTE — PATIENT INSTRUCTIONS
[facility] [phone] for concerns about pregnancy or Labor.      Weekly visits

## 2018-06-05 NOTE — NURSING NOTE
"Chief Complaint   Patient presents with     Prenatal Care     cramping, baby moving well        Initial /88  Pulse 102  Temp 97.8  F (36.6  C) (Oral)  Resp 16  Wt 183 lb 3.2 oz (83.1 kg)  LMP 09/11/2017  BMI 33.51 kg/m2 Estimated body mass index is 33.51 kg/(m^2) as calculated from the following:    Height as of 3/19/18: 5' 2\" (1.575 m).    Weight as of this encounter: 183 lb 3.2 oz (83.1 kg).  Medication Reconciliation: complete  Sher Boyd CMA    "

## 2018-06-12 ENCOUNTER — PRENATAL OFFICE VISIT (OUTPATIENT)
Dept: FAMILY MEDICINE | Facility: CLINIC | Age: 23
End: 2018-06-12
Payer: COMMERCIAL

## 2018-06-12 VITALS
TEMPERATURE: 97.5 F | OXYGEN SATURATION: 98 % | SYSTOLIC BLOOD PRESSURE: 138 MMHG | WEIGHT: 183 LBS | BODY MASS INDEX: 33.47 KG/M2 | HEART RATE: 99 BPM | DIASTOLIC BLOOD PRESSURE: 68 MMHG

## 2018-06-12 DIAGNOSIS — Z34.03 ENCOUNTER FOR SUPERVISION OF NORMAL FIRST PREGNANCY IN THIRD TRIMESTER: Primary | ICD-10-CM

## 2018-06-12 PROCEDURE — 99207 ZZC PRENATAL VISIT: CPT | Performed by: FAMILY MEDICINE

## 2018-06-12 NOTE — MR AVS SNAPSHOT
After Visit Summary   6/12/2018    Jake Gordillo    MRN: [id number]           Patient Information     Date Of Birth          1995        Visit Information        Provider Department      6/12/2018 3:40 PM Kallie Goldberg MD [facility]        Today's Diagnoses     Encounter for supervision of normal first pregnancy in third trimester    -  1      Care Instructions    Report to or call [facility] [phone] for concerns about pregnancy or Labor.    Weekly visits          Follow-ups after your visit        Your next 10 appointments already scheduled     Jun 18, 2018  3:40 PM CDT   ESTABLISHED PRENATAL with Kallie Goldberg MD   [facility]    [address] [facility] [address] [phone]              Who to contact     If you have questions or need follow up information about today's clinic visit or your schedule please contact [facility] directly at [phone].  Normal or non-critical lab and imaging results will be communicated to you by MDxHealthhart, letter or phone within 4 business days after the clinic has received the results. If you do not hear from us within 7 days, please contact the clinic through [employer] or phone. If you have a critical or abnormal lab result, we will notify you by phone as soon as possible.  Submit refill requests through foc.us or call your pharmacy and they will forward the refill request to us. Please allow 3 business days for your refill to be completed.          Additional Information About Your Visit        MyChart Information     [email or web address] gives you secure access to your electronic health record. If you see a primary care provider, you can also send messages to your care team and make appointments. If you have questions, please call your primary care clinic.  If you do not have a primary care provider, please call [phone] and they will assist you.        Care EveryWhere  ID     This is your Care EveryWhere ID. This could be used by other organizations to access your [place] medical records  [id number]        Your Vitals Were     Pulse Temperature Last Period Pulse Oximetry BMI (Body Mass Index)       99 97.5  F (36.4  C) (Oral) 09/11/2017 98% 33.47 kg/m2        Blood Pressure from Last 3 Encounters:   06/12/18 138/68   06/05/18 126/68   05/30/18 118/69    Weight from Last 3 Encounters:   06/12/18 183 lb (83 kg)   06/05/18 183 lb 3.2 oz (83.1 kg)   05/30/18 181 lb (82.1 kg)              Today, you had the following     No orders found for display       Primary Care Provider Office Phone # Fax #    Kallie Goldberg [phone] [address] [facility] [address]        Equal Access to Services     [facility]: Hadii mansi guajardo hadasho Sokush, waaxda luqadaha, qaybta kaalmada adeegyada, willard galdamez . So [facility] [phone].    ATENCIÓN: Si habla español, tiene a kuo disposición servicios gratuitos de asistencia lingüística. Llame al [phone].    We comply with applicable federal civil rights laws and [place] laws. We do not discriminate on the basis of race, color, national origin, age, disability, sex, sexual orientation, or gender identity.            Thank you!     Thank you for choosing [facility]  for your care. Our goal is always to provide you with excellent care. Hearing back from our patients is one way we can continue to improve our services. Please take a few minutes to complete the written survey that you may receive in the mail after your visit with us. Thank you!             Your Updated Medication List - Protect others around you: Learn how to safely use, store and throw away your medicines at www.disposemymeds.org.          This list is accurate as of 6/12/18 11:59 PM.  Always use your most recent med list.                   Brand Name Dispense Instructions for use Diagnosis    ferrous  fumarate 65 mg ([place]. FE)-Vitamin C 125 mg  MG Tabs tablet    VITRON C    100 tablet    Take 1 tablet by mouth daily    Anemia of pregnancy in second trimester       ondansetron 4 MG ODT tab    ZOFRAN ODT    30 tablet    Take 1 tablet (4 mg) by mouth every 8 hours as needed for nausea    Nausea/vomiting in pregnancy       PRENATAL ADULT GUMMY/DHA/FA PO           sertraline 50 MG tablet    ZOLOFT    90 tablet    Take 1 tablet (50 mg) by mouth daily    Major depressive disorder, recurrent episode, moderate (H), Anxiety

## 2018-06-12 NOTE — PROGRESS NOTES
Doing well.  No concerns today.  Cervix is posterior, finger-tip dilated and soft.  Prenatal flowsheet information is reviewed.  Discussed signs of labor and when to call or come in.  Discussed kick counts and fetal movement.  Reportable signs and symptoms discussed.  GBS neg  F/u weekly

## 2018-06-12 NOTE — PATIENT INSTRUCTIONS
[facility] [phone] for concerns about pregnancy or Labor.    Weekly visits

## 2018-06-12 NOTE — NURSING NOTE
"Chief Complaint   Patient presents with     Prenatal Care       Initial /85 (Cuff Size: Adult Regular)  Pulse 99  Temp 97.5  F (36.4  C) (Oral)  Wt 183 lb (83 kg)  LMP 09/11/2017  SpO2 98%  BMI 33.47 kg/m2 Estimated body mass index is 33.47 kg/(m^2) as calculated from the following:    Height as of 3/19/18: 5' 2\" (1.575 m).    Weight as of this encounter: 183 lb (83 kg).      Margaret Marcano LPN    "

## 2018-06-13 ENCOUNTER — MYC MEDICAL ADVICE (OUTPATIENT)
Dept: FAMILY MEDICINE | Facility: CLINIC | Age: 23
End: 2018-06-13

## 2018-06-13 NOTE — TELEPHONE ENCOUNTER
Per chart review pt read message from RN at 11:27am:      MyChart User Last Read On     Jake CID Labrant 6/13/2018 11:27 AM         Razia Ceja RN

## 2018-06-28 ENCOUNTER — TELEPHONE (OUTPATIENT)
Dept: FAMILY MEDICINE | Facility: CLINIC | Age: 23
End: 2018-06-28

## 2018-06-28 DIAGNOSIS — F41.9 ANXIETY: ICD-10-CM

## 2018-06-28 DIAGNOSIS — F33.1 MAJOR DEPRESSIVE DISORDER, RECURRENT EPISODE, MODERATE (H): Primary | ICD-10-CM

## 2018-06-28 NOTE — TELEPHONE ENCOUNTER
Patient completed [place] post  depression screening at child well check.  Patient with score of 21.  Please follow up as indicated

## 2018-07-09 ENCOUNTER — MYC MEDICAL ADVICE (OUTPATIENT)
Dept: FAMILY MEDICINE | Facility: CLINIC | Age: 23
End: 2018-07-09

## 2018-07-09 DIAGNOSIS — O26.86 POLYMORPHIC ERUPTION OF PREGNANCY: Primary | ICD-10-CM

## 2018-07-11 RX ORDER — CLOBETASOL PROPIONATE 0.5 MG/G
CREAM TOPICAL
Qty: 60 G | Refills: 3 | Status: SHIPPED | OUTPATIENT
Start: 2018-07-11 | End: 2018-08-28

## 2018-07-23 ENCOUNTER — E-VISIT (OUTPATIENT)
Dept: FAMILY MEDICINE | Facility: CLINIC | Age: 23
End: 2018-07-23
Payer: COMMERCIAL

## 2018-07-23 DIAGNOSIS — F33.1 MAJOR DEPRESSIVE DISORDER, RECURRENT EPISODE, MODERATE (H): ICD-10-CM

## 2018-07-23 DIAGNOSIS — F41.9 ANXIETY: ICD-10-CM

## 2018-07-23 PROCEDURE — 99444 ZZC PHYSICIAN ONLINE EVALUATION & MANAGEMENT SERVICE: CPT | Performed by: FAMILY MEDICINE

## 2018-07-23 RX ORDER — SERTRALINE HYDROCHLORIDE 100 MG/1
100 TABLET, FILM COATED ORAL DAILY
Qty: 90 TABLET | Refills: 1 | Status: SHIPPED | OUTPATIENT
Start: 2018-07-23 | End: 2018-08-28

## 2018-07-23 RX ORDER — ALPRAZOLAM 0.5 MG
0.5 TABLET ORAL 2 TIMES DAILY PRN
Qty: 30 TABLET | Refills: 1 | Status: SHIPPED | OUTPATIENT
Start: 2018-07-23 | End: 2018-09-05

## 2018-07-23 ASSESSMENT — ANXIETY QUESTIONNAIRES
5. BEING SO RESTLESS THAT IT IS HARD TO SIT STILL: NEARLY EVERY DAY
GAD7 TOTAL SCORE: 21
GAD7 TOTAL SCORE: 21
1. FEELING NERVOUS, ANXIOUS, OR ON EDGE: NEARLY EVERY DAY
6. BECOMING EASILY ANNOYED OR IRRITABLE: NEARLY EVERY DAY
GAD7 TOTAL SCORE: 21
7. FEELING AFRAID AS IF SOMETHING AWFUL MIGHT HAPPEN: NEARLY EVERY DAY
4. TROUBLE RELAXING: NEARLY EVERY DAY
3. WORRYING TOO MUCH ABOUT DIFFERENT THINGS: NEARLY EVERY DAY
7. FEELING AFRAID AS IF SOMETHING AWFUL MIGHT HAPPEN: NEARLY EVERY DAY
2. NOT BEING ABLE TO STOP OR CONTROL WORRYING: NEARLY EVERY DAY

## 2018-07-23 ASSESSMENT — PATIENT HEALTH QUESTIONNAIRE - PHQ9
SUM OF ALL RESPONSES TO PHQ QUESTIONS 1-9: 17
10. IF YOU CHECKED OFF ANY PROBLEMS, HOW DIFFICULT HAVE THESE PROBLEMS MADE IT FOR YOU TO DO YOUR WORK, TAKE CARE OF THINGS AT HOME, OR GET ALONG WITH OTHER PEOPLE: VERY DIFFICULT
SUM OF ALL RESPONSES TO PHQ QUESTIONS 1-9: 17

## 2018-07-23 NOTE — MR AVS SNAPSHOT
After Visit Summary   7/23/2018    Jake Gordillo    MRN: [id number]           Patient Information     Date Of Birth          1995        Visit Information        Provider Department      7/23/2018 9:47 AM Kallie Goldberg MD [facility]        Today's Diagnoses     Major depressive disorder, recurrent episode, moderate (H)        Anxiety           Follow-ups after your visit        Your next 10 appointments already scheduled     Aug 28, 2018  3:20 PM CDT   Post Partum with Kallie Goldberg MD   [facility] ([facility]    [address] [address] [phone]              Who to contact     If you have questions or need follow up information about today's clinic visit or your schedule please contact [facility] directly at [phone].  Normal or non-critical lab and imaging results will be communicated to you by MyChart, letter or phone within 4 business days after the clinic has received the results. If you do not hear from us within 7 days, please contact the clinic through [employer] or phone. If you have a critical or abnormal lab result, we will notify you by phone as soon as possible.  Submit refill requests through [employer] or call your pharmacy and they will forward the refill request to us. Please allow 3 business days for your refill to be completed.          Additional Information About Your Visit        MyChart Information     [employer] gives you secure access to your electronic health record. If you see a primary care provider, you can also send messages to your care team and make appointments. If you have questions, please call your primary care clinic.  If you do not have a primary care provider, please call [phone] and they will assist you.        Care EveryWhere ID     This is your Care EveryWhere ID. This could be used by other organizations to access your [facility]  records  [id number]        Your Vitals Were     Last Period                   09/11/2017            Blood Pressure from Last 3 Encounters:   06/12/18 138/68   06/05/18 126/68   05/30/18 118/69    Weight from Last 3 Encounters:   06/12/18 183 lb (83 kg)   06/05/18 183 lb 3.2 oz (83.1 kg)   05/30/18 181 lb (82.1 kg)              Today, you had the following     No orders found for display         Today's Medication Changes          These changes are accurate as of 7/23/18 11:59 PM.  If you have any questions, ask your nurse or doctor.               Start taking these medicines.        Dose/Directions    ALPRAZolam 0.5 MG tablet   Commonly known as:  XANAX   Used for:  Anxiety        Dose:  0.5 mg   Take 1 tablet (0.5 mg) by mouth 2 times daily as needed for anxiety   Quantity:  30 tablet   Refills:  1         These medicines have changed or have updated prescriptions.        Dose/Directions    * sertraline 50 MG tablet   Commonly known as:  ZOLOFT   This may have changed:  Another medication with the same name was changed. Make sure you understand how and when to take each.   Used for:  Anxiety, Major depressive disorder, recurrent episode, moderate (H)        Take 1/2 tablet (25 mg) for 1-2 weeks, then increase to 1 tablet orally daily   Quantity:  45 tablet   Refills:  0       * sertraline 100 MG tablet   Commonly known as:  ZOLOFT   This may have changed:    - medication strength  - how much to take   Used for:  Major depressive disorder, recurrent episode, moderate (H), Anxiety        Dose:  100 mg   Take 1 tablet (100 mg) by mouth daily   Quantity:  90 tablet   Refills:  1       * Notice:  This list has 2 medication(s) that are the same as other medications prescribed for you. Read the directions carefully, and ask your doctor or other care provider to review them with you.         Where to get your medicines      These medications were sent to [id number] - [place] - [address]  [address] AT [facility] [address] [facility] [address]  [address] [address]    Hours:  24-hours Phone:  [phone]     sertraline 100 MG tablet         Some of these will need a paper prescription and others can be bought over the counter.  Ask your nurse if you have questions.     Bring a paper prescription for each of these medications     ALPRAZolam 0.5 MG tablet                Primary Care Provider Office Phone # Fax #    Kallie Mari Goldberg [phone] [address] [facility] [address]        Equal Access to Services     : Hadii aad ku hadasho Soomaali, waaxda luqadaha, qaybta kaalmada adeegyada, willard campos hayjohn galdamez . So [facility] [phone].    ATENCIÓN: Si habla español, tiene a kuo disposición servicios gratuitos de asistencia lingüística. [facility] [phone].    We comply with applicable federal civil rights laws and [place] laws. We do not discriminate on the basis of race, color, national origin, age, disability, sex, sexual orientation, or gender identity.            Thank you!     Thank you for choosing [facility]  for your care. Our goal is always to provide you with excellent care. Hearing back from our patients is one way we can continue to improve our services. Please take a few minutes to complete the written survey that you may receive in the mail after your visit with us. Thank you!             Your Updated Medication List - Protect others around you: Learn how to safely use, store and throw away your medicines at www.disposemymeds.org.          This list is accurate as of 7/23/18 11:59 PM.  Always use your most recent med list.                   Brand Name Dispense Instructions for use Diagnosis    ALPRAZolam 0.5 MG tablet    XANAX    30 tablet    Take 1 tablet (0.5 mg) by mouth 2 times daily as needed for anxiety    Anxiety       clobetasol 0.05 % cream    TEMOVATE    60 g    Apply sparingly to affected area  twice daily as needed.  Do not apply to face.    Polymorphic eruption of pregnancy       ferrous fumarate 65 mg ([place]. FE)-Vitamin C 125 mg  MG Tabs tablet    VITRON C    100 tablet    Take 1 tablet by mouth daily    Anemia of pregnancy in second trimester       ondansetron 4 MG ODT tab    ZOFRAN ODT    30 tablet    Take 1 tablet (4 mg) by mouth every 8 hours as needed for nausea    Nausea/vomiting in pregnancy       PRENATAL ADULT GUMMY/DHA/FA PO           * sertraline 50 MG tablet    ZOLOFT    45 tablet    Take 1/2 tablet (25 mg) for 1-2 weeks, then increase to 1 tablet orally daily    Anxiety, Major depressive disorder, recurrent episode, moderate (H)       * sertraline 100 MG tablet    ZOLOFT    90 tablet    Take 1 tablet (100 mg) by mouth daily    Major depressive disorder, recurrent episode, moderate (H), Anxiety       * Notice:  This list has 2 medication(s) that are the same as other medications prescribed for you. Read the directions carefully, and ask your doctor or other care provider to review them with you.

## 2018-07-24 ASSESSMENT — ANXIETY QUESTIONNAIRES: GAD7 TOTAL SCORE: 21

## 2018-07-24 ASSESSMENT — PATIENT HEALTH QUESTIONNAIRE - PHQ9: SUM OF ALL RESPONSES TO PHQ QUESTIONS 1-9: 17

## 2018-08-15 ENCOUNTER — OFFICE VISIT (OUTPATIENT)
Dept: URGENT CARE | Facility: URGENT CARE | Age: 23
End: 2018-08-15
Payer: COMMERCIAL

## 2018-08-15 VITALS
BODY MASS INDEX: 28.17 KG/M2 | SYSTOLIC BLOOD PRESSURE: 148 MMHG | DIASTOLIC BLOOD PRESSURE: 93 MMHG | HEART RATE: 127 BPM | TEMPERATURE: 98.7 F | OXYGEN SATURATION: 96 % | WEIGHT: 154 LBS

## 2018-08-15 DIAGNOSIS — Z72.0 TOBACCO USE: ICD-10-CM

## 2018-08-15 DIAGNOSIS — R05.9 COUGH: Primary | ICD-10-CM

## 2018-08-15 DIAGNOSIS — J98.01 ACUTE BRONCHOSPASM: ICD-10-CM

## 2018-08-15 PROCEDURE — 99214 OFFICE O/P EST MOD 30 MIN: CPT | Performed by: INTERNAL MEDICINE

## 2018-08-15 RX ORDER — AZITHROMYCIN 250 MG/1
TABLET, FILM COATED ORAL
Qty: 6 TABLET | Refills: 0 | Status: SHIPPED | OUTPATIENT
Start: 2018-08-15 | End: 2018-08-28

## 2018-08-15 RX ORDER — ALBUTEROL SULFATE 90 UG/1
2 AEROSOL, METERED RESPIRATORY (INHALATION) EVERY 4 HOURS PRN
Qty: 1 INHALER | Refills: 0 | Status: SHIPPED | OUTPATIENT
Start: 2018-08-15

## 2018-08-15 RX ORDER — PREDNISONE 20 MG/1
60 TABLET ORAL DAILY
Qty: 15 TABLET | Refills: 0 | Status: SHIPPED | OUTPATIENT
Start: 2018-08-15 | End: 2018-08-28

## 2018-08-15 NOTE — MR AVS SNAPSHOT
After Visit Summary   8/15/2018    Jake Gordillo    MRN: [id number]           Patient Information     Date Of Birth          1995        Visit Information        Provider Department      8/15/2018 5:00 PM Loretta Saravia MD [facility]        Today's Diagnoses     Cough    -  1    Acute bronchospasm        Tobacco use           Follow-ups after your visit        Follow-up notes from your care team     Return in about 5 days (around 8/20/2018) for follow up with primary doctor.      Your next 10 appointments already scheduled     Aug 28, 2018  3:20 PM CDT   [facility] Partum with Kallie Goldberg MD   [facility]    [address] [address] [phone]              Who to contact     If you have questions or need follow up information about today's clinic visit or your schedule please contact [facility] directly at [phone].  Normal or non-critical lab and imaging results will be communicated to you by MyChart, letter or phone within 4 business days after the clinic has received the results. If you do not hear from us within 7 days, please contact the clinic through [employer] or phone. If you have a critical or abnormal lab result, we will notify you by phone as soon as possible.  Submit refill requests through [employer] or call your pharmacy and they will forward the refill request to us. Please allow 3 business days for your refill to be completed.          Additional Information About Your Visit        MyChart Information     [employer] gives you secure access to your electronic health record. If you see a primary care provider, you can also send messages to your care team and make appointments. If you have questions, please call your primary care clinic.  If you do not have a primary care provider, please call [phone] and they will assist you.        Care EveryWhere ID     This is your Care EveryWhere  ID. This could be used by other organizations to access your [facility] medical records  [id number]        Your Vitals Were     Pulse Temperature Last Period Pulse Oximetry Breastfeeding? BMI (Body Mass Index)    127 98.7  F (37.1  C) (Oral) 09/11/2017 96% No 28.17 kg/m2       Blood Pressure from Last 3 Encounters:   08/15/18 (!) 148/93   06/12/18 138/68   06/05/18 126/68    Weight from Last 3 Encounters:   08/15/18 154 lb (69.9 kg)   06/12/18 183 lb (83 kg)   06/05/18 183 lb 3.2 oz (83.1 kg)              Today, you had the following     No orders found for display         Today's Medication Changes          These changes are accurate as of 8/15/18  5:44 PM.  If you have any questions, ask your nurse or doctor.               Start taking these medicines.        Dose/Directions    albuterol 108 (90 Base) MCG/ACT inhaler   Commonly known as:  PROAIR HFA/PROVENTIL HFA/VENTOLIN HFA   Used for:  Cough, Acute bronchospasm   Started by:  Loretta Saravia MD        Dose:  2 puff   Inhale 2 puffs into the lungs every 4 hours as needed   Quantity:  1 Inhaler   Refills:  0       azithromycin 250 MG tablet   Commonly known as:  ZITHROMAX   Used for:  Cough   Started by:  Loretta Saravia MD        Two tablets first day, then one tablet daily for four days.   Quantity:  6 tablet   Refills:  0       predniSONE 20 MG tablet   Commonly known as:  DELTASONE   Used for:  Cough, Acute bronchospasm   Started by:  Loretta Saravia MD        Dose:  60 mg   Take 3 tablets (60 mg) by mouth daily   Quantity:  15 tablet   Refills:  0            Where to get your medicines      These medications were sent to [facility] - [place], [place] - [address] [address]  [address] [address] [place] [address]    Hours:  24-hours Phone:  [phone]     albuterol 108 (90 Base) MCG/ACT inhaler    azithromycin 250 MG tablet    predniSONE 20 MG tablet                Primary Care Provider  Office Phone # Fax #    Kallie Mari Goldberg [phone] [address] [facility] [address] [facility] [address]        Equal Access to Services     OSKAR THIBODEAUX : Hadsimone mansi guajardo tricia Hook, wamansida luqsha, qaybta kapito bui, willard posada tanyajanene de jesus ladonijuliette villegas. So [facility] [phone].    ATENCIÓN: Si habla español, tiene a kuo disposición servicios gratuitos de asistencia lingüística. Llame al [phone].    We comply with applicable federal civil rights laws and [place] laws. We do not discriminate on the basis of race, color, national origin, age, disability, sex, sexual orientation, or gender identity.            Thank you!     Thank you for choosing [facility]  for your care. Our goal is always to provide you with excellent care. Hearing back from our patients is one way we can continue to improve our services. Please take a few minutes to complete the written survey that you may receive in the mail after your visit with us. Thank you!             Your Updated Medication List - Protect others around you: Learn how to safely use, store and throw away your medicines at www.disposemymeds.org.          This list is accurate as of 8/15/18  5:44 PM.  Always use your most recent med list.                   Brand Name Dispense Instructions for use Diagnosis    albuterol 108 (90 Base) MCG/ACT inhaler    PROAIR HFA/PROVENTIL HFA/VENTOLIN HFA    1 Inhaler    Inhale 2 puffs into the lungs every 4 hours as needed    Cough, Acute bronchospasm       ALPRAZolam 0.5 MG tablet    XANAX    30 tablet    Take 1 tablet (0.5 mg) by mouth 2 times daily as needed for anxiety    Anxiety       azithromycin 250 MG tablet    ZITHROMAX    6 tablet    Two tablets first day, then one tablet daily for four days.    Cough       clobetasol 0.05 % cream    TEMOVATE    60 g    Apply sparingly to affected area twice daily as needed.  Do not apply to face.    Polymorphic eruption of pregnancy       ferrous  fumarate 65 mg ([place]. FE)-Vitamin C 125 mg  MG Tabs tablet    VITRON C    100 tablet    Take 1 tablet by mouth daily    Anemia of pregnancy in second trimester       ondansetron 4 MG ODT tab    ZOFRAN ODT    30 tablet    Take 1 tablet (4 mg) by mouth every 8 hours as needed for nausea    Nausea/vomiting in pregnancy       predniSONE 20 MG tablet    DELTASONE    15 tablet    Take 3 tablets (60 mg) by mouth daily    Cough, Acute bronchospasm       PRENATAL ADULT GUMMY/DHA/FA PO           * sertraline 50 MG tablet    ZOLOFT    45 tablet    Take 1/2 tablet (25 mg) for 1-2 weeks, then increase to 1 tablet orally daily    Anxiety, Major depressive disorder, recurrent episode, moderate (H)       * sertraline 100 MG tablet    ZOLOFT    90 tablet    Take 1 tablet (100 mg) by mouth daily    Major depressive disorder, recurrent episode, moderate (H), Anxiety       * Notice:  This list has 2 medication(s) that are the same as other medications prescribed for you. Read the directions carefully, and ask your doctor or other care provider to review them with you.

## 2018-08-15 NOTE — PROGRESS NOTES
SUBJECTIVE:  Jake Gordillo is an 23 year old female who presents for cough.  Five days ago started with cold sxs like runny nose and cough.  Then two days ago started to cough a lot more and feeling wheezing.  Cough is occasionally productive of yellow sputum.  No fevers.  Go some chills and sweats yesterday.  No recent travel.  No known exposures.  No h/o asthma.  Is a smoker.  No n/v/d.  Took some dayquil, airborne, and excedrin which initially helped but not since cough got worse.       has a past medical history of Anxiety disorder. depression  Social History     Social History     Marital status: Single     Spouse name: N/A     Number of children: N/A     Years of education: N/A     Social History Main Topics     Smoking status: Former Smoker     Smokeless tobacco: Never Used      Comment: nonsmoking household     Alcohol use No     Drug use: No     Sexual activity: No     Other Topics Concern     None     Social History Narrative     Family History   Problem Relation Age of Onset     Asthma Mother      Allergies Mother      Depression Mother      Asthma Brother      Allergies Brother      Asthma Sister      Allergies Sister      Depression Father      GASTROINTESTINAL DISEASE Father      crohns disease     Depression Paternal Grandmother      Depression Brother      HEART DISEASE Maternal Grandmother        ALLERGIES:  Review of patient's allergies indicates no known allergies.    Current Outpatient Prescriptions   Medication     ALPRAZolam (XANAX) 0.5 MG tablet     clobetasol (TEMOVATE) 0.05 % cream     ferrous fumarate 65 mg, [place]. FE,-Vitamin C 125 mg (VITRON C)  MG TABS tablet     ondansetron (ZOFRAN ODT) 4 MG ODT tab     Prenatal MV & Min w/FA-DHA (PRENATAL ADULT GUMMY/DHA/FA PO)     sertraline (ZOLOFT) 100 MG tablet     sertraline (ZOLOFT) 50 MG tablet     No current facility-administered medications for this visit.          ROS:  ROS is done and is negative for general/constitutional, eye,  ENT, Respiratory, cardiovascular, GI, , Skin, musculoskeletal except as noted elsewhere.  All other review of systems negative except as noted elsewhere.      OBJECTIVE:  BP (!) 148/93  Pulse 127  Temp 98.7  F (37.1  C) (Oral)  Wt 154 lb (69.9 kg)  LMP 09/11/2017  SpO2 96%  Breastfeeding? No  BMI 28.17 kg/m2  GENERAL APPEARANCE: Alert, in no acute distress  EYES: normal  EARS: External ears normal. Canals clear. TM's normal.  NOSE:mildly inflamed mucosa  OROPHARYNX:normal  NECK:No adenopathy,masses or thyromegaly  RESP: diffuse end exp wheeze and prolonged expiratory phase.  No crackles.  Intermittent upper airway noise.  CV:regular rate and rhythm and no murmurs, clicks, or gallops  ABDOMEN: Abdomen soft, non-tender. BS normal. No masses, organomegaly  SKIN: no ulcers, lesions or rash  MUSCULOSKELETAL:Musculoskeletal normal      RESULTS  .  No results found for this or any previous visit (from the past 48 hour(s)).    ASSESSMENT/PLAN:    ASSESSMENT / PLAN:  (R05) Cough  (primary encounter diagnosis)  Comment: with wheezing and shortness of breath.  C/w bronchospasm.  Likely triggered by viral or atypical bacterial infection based on sxs, so will tx with zmax as well as prednisone and albuterol  Plan: predniSONE (DELTASONE) 20 MG tablet, albuterol         (PROAIR HFA/PROVENTIL HFA/VENTOLIN HFA) 108 (90        Base) MCG/ACT inhaler, azithromycin (ZITHROMAX)        250 MG tablet        Reviewed medication instructions and side effects. Follow up if experiences side effects.. I reviewed supportive care, expected course, and signs of concern.  Follow up as needed or if she does not improve within 5 day(s) or if worsens in any way.  Reviewed red flag symptoms and is to go to the ER if experiences any of these.    (J98.01) Acute bronchospasm  Comment: appears to be reactive as no h/o asthma or copd but does smoke 1ppd which likely has worsened her sxs  Plan: predniSONE (DELTASONE) 20 MG tablet, albuterol          (PROAIR HFA/PROVENTIL HFA/VENTOLIN HFA) 108 (90        Base) MCG/ACT inhaler        Reviewed medication instructions and side effects. Follow up if experiences side effects.. I reviewed supportive care, expected course, and signs of concern.  Follow up as needed or if she does not improve within 5 day(s) or if worsens in any way.  Reviewed red flag symptoms and is to go to the ER if experiences any of these.  Advised to quit smoking as well.      (Z72.0) Tobacco use  Comment:  Smoking has likely worsened her current resp sxs.  Plan: Reviewed the importance of smoking cessation, benefits of quitting, and risks of continuing to smoke.  Discussed options available for help including mediations and social support.      See [facility] for orders, medications, letters, patient instructions    Loretta Saravia M.D.

## 2018-08-21 NOTE — PROGRESS NOTES
SUBJECTIVE: Jake is here for a 6-week postpartum checkup.    Delivery date was 18. She had a  of a viable girl, weight 7 pounds 5 oz., with no complications.  Since delivery, she has not been breast feeding.  She has no signs of infection, bleeding or other complications.  She is not pregnant.  We discussed contraceptions and she has chosen oral contraceptives and returning for IUD.  She  has not had intercourse since delivery and complains of No discomfort. Patient screened for postpartum depression and complaints are anxiety and depression. Screening has also been completed for intimate partner violence.    EXAM:  Today's Depression Rating was   PHQ-9 SCORE 2018   Total Score -   Total Score MyChart -   Total Score 17       GYN PELVIC: normal external genitalia, normal urethral meatus, normal vaginal mucosa, normal cervix, normal adnexa, no masses or tenderness and uterus normal size and shape  MS: Extremities normal. No deformaties, edema or skin discoloration.  SKIN: no ulcers, lesions or rash  PSYCH: normal cognition, flat affect with anxiety, no SI/HI     ASSESSMENT:   (Z39.2) Routine postpartum follow-up  (primary encounter diagnosis)  Comment: doing well physically  Plan: levonorgestrel-ethinyl estradiol         (AVIANE,ALESSE,LESSINA) 0.1-20 MG-MCG per         tablet        Start OCP , f/u for IUD if desires    (F33.1) Major depressive disorder, recurrent episode, moderate (H)  (F41.9) Anxiety  Comment: not well controlled  Plan: sertraline (ZOLOFT) 100 MG tablet        Increase dose to 150 mg daily, consider buspar if no improvement     (Z23) Need for prophylactic vaccination and inoculation against influenza  Comment: due  Plan: FLU VACCINE, SPLIT VIRUS, IM (QUADRIVALENT)         [79236]- >3 YRS, Vaccine Administration,         Initial [53920]

## 2018-08-28 ENCOUNTER — PRENATAL OFFICE VISIT (OUTPATIENT)
Dept: FAMILY MEDICINE | Facility: CLINIC | Age: 23
End: 2018-08-28
Payer: COMMERCIAL

## 2018-08-28 VITALS
DIASTOLIC BLOOD PRESSURE: 74 MMHG | HEART RATE: 105 BPM | TEMPERATURE: 97.2 F | SYSTOLIC BLOOD PRESSURE: 135 MMHG | WEIGHT: 157.4 LBS | RESPIRATION RATE: 16 BRPM | BODY MASS INDEX: 28.79 KG/M2

## 2018-08-28 DIAGNOSIS — F33.1 MAJOR DEPRESSIVE DISORDER, RECURRENT EPISODE, MODERATE (H): ICD-10-CM

## 2018-08-28 DIAGNOSIS — F41.9 ANXIETY: ICD-10-CM

## 2018-08-28 DIAGNOSIS — Z23 NEED FOR PROPHYLACTIC VACCINATION AND INOCULATION AGAINST INFLUENZA: ICD-10-CM

## 2018-08-28 PROCEDURE — 99212 OFFICE O/P EST SF 10 MIN: CPT | Performed by: FAMILY MEDICINE

## 2018-08-28 PROCEDURE — 90471 IMMUNIZATION ADMIN: CPT | Performed by: FAMILY MEDICINE

## 2018-08-28 PROCEDURE — 99207 ZZC POST PARTUM EXAM: CPT | Mod: 25 | Performed by: FAMILY MEDICINE

## 2018-08-28 PROCEDURE — 90686 IIV4 VACC NO PRSV 0.5 ML IM: CPT | Performed by: FAMILY MEDICINE

## 2018-08-28 RX ORDER — LEVONORGESTREL/ETHIN.ESTRADIOL 0.1-0.02MG
1 TABLET ORAL DAILY
Qty: 84 TABLET | Refills: 1 | Status: SHIPPED | OUTPATIENT
Start: 2018-08-28

## 2018-08-28 RX ORDER — SERTRALINE HYDROCHLORIDE 100 MG/1
150 TABLET, FILM COATED ORAL DAILY
Qty: 135 TABLET | Refills: 1 | Status: SHIPPED | OUTPATIENT
Start: 2018-08-28 | End: 2018-10-29 | Stop reason: ALTCHOICE

## 2018-08-28 ASSESSMENT — ANXIETY QUESTIONNAIRES
1. FEELING NERVOUS, ANXIOUS, OR ON EDGE: NEARLY EVERY DAY
2. NOT BEING ABLE TO STOP OR CONTROL WORRYING: NEARLY EVERY DAY
GAD7 TOTAL SCORE: 19
6. BECOMING EASILY ANNOYED OR IRRITABLE: NEARLY EVERY DAY
IF YOU CHECKED OFF ANY PROBLEMS ON THIS QUESTIONNAIRE, HOW DIFFICULT HAVE THESE PROBLEMS MADE IT FOR YOU TO DO YOUR WORK, TAKE CARE OF THINGS AT HOME, OR GET ALONG WITH OTHER PEOPLE: VERY DIFFICULT
5. BEING SO RESTLESS THAT IT IS HARD TO SIT STILL: MORE THAN HALF THE DAYS
3. WORRYING TOO MUCH ABOUT DIFFERENT THINGS: NEARLY EVERY DAY
7. FEELING AFRAID AS IF SOMETHING AWFUL MIGHT HAPPEN: NEARLY EVERY DAY

## 2018-08-28 ASSESSMENT — PATIENT HEALTH QUESTIONNAIRE - PHQ9: 5. POOR APPETITE OR OVEREATING: MORE THAN HALF THE DAYS

## 2018-08-28 NOTE — MR AVS SNAPSHOT
After Visit Summary   8/28/2018    Jake Gordillo    MRN: [id number]           Patient Information     Date Of Birth          1995        Visit Information        Provider Department      8/28/2018 3:20 PM Kallie Goldberg MD [facility]        Today's Diagnoses     Routine postpartum follow-up    -  1    Major depressive disorder, recurrent episode, moderate (H)        Anxiety          Care Instructions        Increase sertraline to 150 mg daily, will consider adding Buspar for anxiety if not helpful.      Start birth control pills on Sunday. Take everyday.          Follow-ups after your visit        Follow-up notes from your care team     Return in about 8 weeks (around 10/23/2018) for 40 min procedure iud.      Who to contact     If you have questions or need follow up information about today's clinic visit or your schedule please contact [facility] directly at [phone].  Normal or non-critical lab and imaging results will be communicated to you by MyChart, letter or phone within 4 business days after the clinic has received the results. If you do not hear from us within 7 days, please contact the clinic through Zapp[employer]hart or phone. If you have a critical or abnormal lab result, we will notify you by phone as soon as possible.  Submit refill requests through Cuponzote or call your pharmacy and they will forward the refill request to us. Please allow 3 business days for your refill to be completed.          Additional Information About Your Visit        MyChart Information     C[employer]ote gives you secure access to your electronic health record. If you see a primary care provider, you can also send messages to your care team and make appointments. If you have questions, please call your primary care clinic.  If you do not have a primary care provider, please call [phone] and they will assist you.        Care EveryWhere ID     This is your Care EveryWhere ID.  This could be used by other organizations to access your [facility] medical records  [id number]        Your Vitals Were     Pulse Temperature Respirations Last Period Breastfeeding? BMI (Body Mass Index)    105 97.2  F (36.2  C) (Oral) 16 08/27/2018 No 28.79 kg/m2       Blood Pressure from Last 3 Encounters:   08/28/18 135/74   08/15/18 (!) 148/93   06/12/18 138/68    Weight from Last 3 Encounters:   08/28/18 157 lb 6.4 oz (71.4 kg)   08/15/18 154 lb (69.9 kg)   06/12/18 183 lb (83 kg)              Today, you had the following     No orders found for display         Today's Medication Changes          These changes are accurate as of 8/28/18  4:07 PM.  If you have any questions, ask your nurse or doctor.               Start taking these medicines.        Dose/Directions    levonorgestrel-ethinyl estradiol 0.1-20 MG-MCG per tablet   Commonly known as:  ANNITA RECIO LESSINA   Used for:  Routine postpartum follow-up   Started by:  Kallie Goldberg MD        Dose:  1 tablet   Take 1 tablet by mouth daily   Quantity:  84 tablet   Refills:  1         These medicines have changed or have updated prescriptions.        Dose/Directions    * sertraline 50 MG tablet   Commonly known as:  ZOLOFT   This may have changed:  Another medication with the same name was changed. Make sure you understand how and when to take each.   Used for:  Anxiety, Major depressive disorder, recurrent episode, moderate (H)   Changed by:  Kallie Goldberg MD        Take 1/2 tablet (25 mg) for 1-2 weeks, then increase to 1 tablet orally daily   Quantity:  45 tablet   Refills:  0       * sertraline 100 MG tablet   Commonly known as:  ZOLOFT   This may have changed:  how much to take   Used for:  Major depressive disorder, recurrent episode, moderate (H), Anxiety   Changed by:  Kallie Goldberg MD        Dose:  150 mg   Take 1.5 tablets (150 mg) by mouth daily   Quantity:  135 tablet   Refills:  1       * Notice:  This list has 2  medication(s) that are the same as other medications prescribed for you. Read the directions carefully, and ask your doctor or other care provider to review them with you.         Where to get your medicines      These medications were sent to [id number] - [place], [place] - [address] [facility] [address]  [address], [place] [address]    Hours:  24-hours Phone:  [phone]     levonorgestrel-ethinyl estradiol 0.1-20 MG-MCG per tablet    sertraline 100 MG tablet                Primary Care Provider Office Phone # Fax #    Kallie Goldberg [phone] [address] [facility] [address]        Equal Access to Services     OSKAR THIBODEAUX : Hadii aad ku hadasho Somartineali, waaxda luqadaha, qaybta kaalmada adeegyada, willard galdamez . So [facility] [phone].    ATENCIÓN: Si habla español, tiene a kuo disposición servicios gratuitos de asistencia lingüística. [facility] [phone].    We comply with applicable federal civil rights laws and [place] laws. We do not discriminate on the basis of race, color, national origin, age, disability, sex, sexual orientation, or gender identity.            Thank you!     Thank you for choosing [facility]  for your care. Our goal is always to provide you with excellent care. Hearing back from our patients is one way we can continue to improve our services. Please take a few minutes to complete the written survey that you may receive in the mail after your visit with us. Thank you!             Your Updated Medication List - Protect others around you: Learn how to safely use, store and throw away your medicines at www.disposemymeds.org.          This list is accurate as of 8/28/18  4:07 PM.  Always use your most recent med list.                   Brand Name Dispense Instructions for use Diagnosis    albuterol 108 (90 Base) MCG/ACT inhaler    PROAIR HFA/PROVENTIL  HFA/VENTOLIN HFA    1 Inhaler    Inhale 2 puffs into the lungs every 4 hours as needed    Cough, Acute bronchospasm       ALPRAZolam 0.5 MG tablet    XANAX    30 tablet    Take 1 tablet (0.5 mg) by mouth 2 times daily as needed for anxiety    Anxiety       levonorgestrel-ethinyl estradiol 0.1-20 MG-MCG per tablet    AVIANE,ALESSE,LESSINA    84 tablet    Take 1 tablet by mouth daily    Routine postpartum follow-up       * sertraline 50 MG tablet    ZOLOFT    45 tablet    Take 1/2 tablet (25 mg) for 1-2 weeks, then increase to 1 tablet orally daily    Anxiety, Major depressive disorder, recurrent episode, moderate (H)       * sertraline 100 MG tablet    ZOLOFT    135 tablet    Take 1.5 tablets (150 mg) by mouth daily    Major depressive disorder, recurrent episode, moderate (H), Anxiety       * Notice:  This list has 2 medication(s) that are the same as other medications prescribed for you. Read the directions carefully, and ask your doctor or other care provider to review them with you.

## 2018-08-28 NOTE — PROGRESS NOTES
Injectable Influenza Immunization Documentation    1.  Is the person to be vaccinated sick today?   No    2. Does the person to be vaccinated have an allergy to a component   of the vaccine?   No  Egg Allergy Algorithm Link    3. Has the person to be vaccinated ever had a serious reaction   to influenza vaccine in the past?   No    4. Has the person to be vaccinated ever had Guillain-Barré syndrome?   No    Form completed by Sher Boyd, [facility]

## 2018-08-28 NOTE — PATIENT INSTRUCTIONS
Increase sertraline to 150 mg daily, will consider adding Buspar for anxiety if not helpful.      Start birth control pills on Sunday. Take everyday.

## 2018-08-29 ASSESSMENT — ANXIETY QUESTIONNAIRES: GAD7 TOTAL SCORE: 19

## 2018-08-29 ASSESSMENT — PATIENT HEALTH QUESTIONNAIRE - PHQ9: SUM OF ALL RESPONSES TO PHQ QUESTIONS 1-9: 17

## 2018-08-31 ENCOUNTER — MYC REFILL (OUTPATIENT)
Dept: FAMILY MEDICINE | Facility: CLINIC | Age: 23
End: 2018-08-31

## 2018-08-31 DIAGNOSIS — F41.9 ANXIETY: ICD-10-CM

## 2018-08-31 RX ORDER — ALPRAZOLAM 0.5 MG
0.5 TABLET ORAL 2 TIMES DAILY PRN
Qty: 30 TABLET | Refills: 1 | Status: CANCELLED | OUTPATIENT
Start: 2018-08-31

## 2018-08-31 NOTE — TELEPHONE ENCOUNTER
Message from MyChart:  Original authorizing provider: MD Jake Casanova would like a refill of the following medications:  ALPRAZolam (XANAX) 0.5 MG tablet [Kallie Goldberg MD]    Preferred pharmacy: [facility] [address] [place] [address] [facility] [place] [facility]    Comment:

## 2018-09-04 NOTE — TELEPHONE ENCOUNTER
Controlled Substance Refill Request for Alpr  Problem List Complete:  No     PROVIDER TO CONSIDER COMPLETION OF PROBLEM LIST AND OVERVIEW/CONTROLLED SUBSTANCE AGREEMENT    Last Written Prescription Date:  7/23/18  Last Fill Quantity: 30,   # refills: 1    Last Office Visit with [facility] primary care provider: 8/28/18    Future Office visit:     Controlled substance agreement on file: No.     Processing:     checked in past 3 months?  Yes 9/4/18

## 2018-09-24 ENCOUNTER — MYC MEDICAL ADVICE (OUTPATIENT)
Dept: FAMILY MEDICINE | Facility: CLINIC | Age: 23
End: 2018-09-24

## 2018-10-29 ENCOUNTER — OFFICE VISIT (OUTPATIENT)
Dept: FAMILY MEDICINE | Facility: CLINIC | Age: 23
End: 2018-10-29
Payer: COMMERCIAL

## 2018-10-29 VITALS
HEART RATE: 120 BPM | DIASTOLIC BLOOD PRESSURE: 89 MMHG | WEIGHT: 157.4 LBS | BODY MASS INDEX: 28.79 KG/M2 | RESPIRATION RATE: 14 BRPM | TEMPERATURE: 98.7 F | SYSTOLIC BLOOD PRESSURE: 149 MMHG

## 2018-10-29 DIAGNOSIS — Z30.430 ENCOUNTER FOR INSERTION OF INTRAUTERINE CONTRACEPTIVE DEVICE: Primary | ICD-10-CM

## 2018-10-29 PROBLEM — Z34.03 ENCOUNTER FOR SUPERVISION OF NORMAL FIRST PREGNANCY IN THIRD TRIMESTER: Status: RESOLVED | Noted: 2018-05-07 | Resolved: 2018-10-29

## 2018-10-29 PROCEDURE — 99207 ZZC NO CHARGE LOS: CPT | Performed by: FAMILY MEDICINE

## 2018-10-29 PROCEDURE — 58300 INSERT INTRAUTERINE DEVICE: CPT | Performed by: FAMILY MEDICINE

## 2018-10-29 RX ORDER — QUETIAPINE FUMARATE 100 MG/1
1 TABLET, FILM COATED ORAL DAILY
COMMUNITY
Start: 2018-09-26

## 2018-10-29 NOTE — PROGRESS NOTES
IUD INSERTION:  Jake Gordillo is a 23 year old female who presents today requesting placement of an @IUD@.  She is a G 1 P 1 with Patient's last menstrual period was 09/11/2017.     PMH/PSH/Meds/All were reviewed and updated at this visit.      I discussed the method, effectiveness, contraindications, risk, benefits, alternatives and the insertion procedure itself.  Patient was an appropriate candidate and desired to proceed.    Exam:  Pelvic: External genitalia and vagina normal. .    After appropriate preparation, the cervix was grasped with a tenaculum and the uterine cavity sounded to 8 cm.  The Mirena IUD was inserted using standard and sterile technique.  The strings were trimmed to approximately 2.5cm.  No complications. Patient tolerated the procedure well.    IUD Lot#: [id number]  Exp Date: 4/2021  NDC#:82875-775-02    The IUD effectiveness is 5 years.   Followup should be in 3 months with Kallie Goldberg MD.  Alert clinic to any problems.

## 2018-10-29 NOTE — LETTER
My Depression Action Plan  Name: Jake Gordillo   Date of Birth 1995  Date: 10/26/2018    My doctor: Kallie Goldberg   My clinic: [facility]  [address] [place] [address] [phone]          GREEN    ZONE   Good Control    What it looks like:     Things are going generally well. You have normal up s and down s. You may even feel depressed from time to time, but bad moods usually last less than a day.   What you need to do:  1. Continue to care for yourself (see self care plan)  2. Check your depression survival kit and update it as needed  3. Follow your physician s recommendations including any medication.  4. Do not stop taking medication unless you consult with your physician first.           YELLOW         ZONE Getting Worse    What it looks like:     Depression is starting to interfere with your life.     It may be hard to get out of bed; you may be starting to isolate yourself from others.    Symptoms of depression are starting to last most all day and this has happened for several days.     You may have suicidal thoughts but they are not constant.   What you need to do:     1. Call your care team, your response to treatment will improve if you keep your care team informed of your progress. Yellow periods are signs an adjustment may need to be made.     2. Continue your self-care, even if you have to fake it!    3. Talk to someone in your support network    4. Open up your depression survival kit           RED    ZONE Medical Alert - Get Help    What it looks like:     Depression is seriously interfering with your life.     You may experience these or other symptoms: You can t get out of bed most days, can t work or engage in other necessary activities, you have trouble taking care of basic hygiene, or basic responsibilities, thoughts of suicide or death that will not go away, self-injurious behavior.     What you need to do:  1. Call your care team and  request a same-day appointment. If they are not available (weekends or after hours) call your local crisis line, emergency room or 911.            Depression Self Care Plan / Survival Kit    Self-Care for Depression  Here s the deal. Your body and mind are really not as separate as most people think.  What you do and think affects how you feel and how you feel influences what you do and think. This means if you do things that people who feel good do, it will help you feel better.  Sometimes this is all it takes.  There is also a place for medication and therapy depending on how severe your depression is, so be sure to consult with your medical provider and/ or Behavioral Health Consultant if your symptoms are worsening or not improving.     In order to better manage my stress, I will:    Exercise  Get some form of exercise, every day. This will help reduce pain and release endorphins, the  feel good  chemicals in your brain. This is almost as good as taking antidepressants!  This is not the same as joining a gym and then never going! (they count on that by the way ) It can be as simple as just going for a walk or doing some gardening, anything that will get you moving.      Hygiene   Maintain good hygiene (Get out of bed in the morning, Make your bed, Brush your teeth, Take a shower, and Get dressed like you were going to work, even if you are unemployed).  If your clothes don't fit try to get ones that do.    Diet  I will strive to eat foods that are good for me, drink plenty of water, and avoid excessive sugar, caffeine, alcohol, and other mood-altering substances.  Some foods that are helpful in depression are: complex carbohydrates, B vitamins, flaxseed, fish or fish oil, fresh fruits and vegetables.    Psychotherapy  I agree to participate in Individual Therapy (if recommended).    Medication  If prescribed medications, I agree to take them.  Missing doses can result in serious side effects.  I understand that  drinking alcohol, or other illicit drug use, may cause potential side effects.  I will not stop my medication abruptly without first discussing it with my provider.    Staying Connected With Others  I will stay in touch with my friends, family members, and my primary care provider/team.    Use your imagination  Be creative.  We all have a creative side; it doesn t matter if it s oil painting, sand castles, or mud pies! This will also kick up the endorphins.    Witness Beauty  (AKA stop and smell the roses) Take a look outside, even in mid-winter. Notice colors, textures. Watch the squirrels and birds.     Service to others  Be of service to others.  There is always someone else in need.  By helping others we can  get out of ourselves  and remember the really important things.  This also provides opportunities for practicing all the other parts of the program.    Humor  Laugh and be silly!  Adjust your TV habits for less news and crime-drama and more comedy.    Control your stress  Try breathing deep, massage therapy, biofeedback, and meditation. Find time to relax each day.     My support system    Clinic Contact:  Phone number:    Contact 1:  Phone number:    Contact 2:  Phone number:    Alevism/:  Phone number:    Therapist:  Phone number:    Local crisis center:    Phone number:    Other community support:  Phone number:

## 2018-10-29 NOTE — MR AVS SNAPSHOT
After Visit Summary   10/29/2018    Jake Gordillo    MRN: [id number]           Patient Information     Date Of Birth          1995        Visit Information        Provider Department      10/29/2018 5:40 PM Kallie Goldberg MD [facility]        Care Instructions      Monitor for symptoms of infection to include a foul smelling discharge, abdominal tenderness or fever without other explanation.  Monitor for heavy bleeding (soaking a maxi pad every hour for 2-3 hours).            Follow-ups after your visit        Who to contact     If you have questions or need follow up information about today's clinic visit or your schedule please contact [facility] directly at [phone].  Normal or non-critical lab and imaging results will be communicated to you by MyChart, letter or phone within 4 business days after the clinic has received the results. If you do not hear from us within 7 days, please contact the clinic through [employer] or phone. If you have a critical or abnormal lab result, we will notify you by phone as soon as possible.  Submit refill requests through [employer] or call your pharmacy and they will forward the refill request to us. Please allow 3 business days for your refill to be completed.          Additional Information About Your Visit        MyChart Information     [employer] gives you secure access to your electronic health record. If you see a primary care provider, you can also send messages to your care team and make appointments. If you have questions, please call your primary care clinic.  If you do not have a primary care provider, please call [phone] and they will assist you.        Care EveryWhere ID     This is your Care EveryWhere ID. This could be used by other organizations to access your [facility] medical records  [id number]        Your Vitals Were     Pulse Temperature Respirations Last Period Breastfeeding? BMI (Body Mass Index)     120 98.7  F (37.1  C) (Oral) 14 10/21/2018 No 28.79 kg/m2       Blood Pressure from Last 3 Encounters:   10/29/18 149/89   08/28/18 135/74   08/15/18 (!) 148/93    Weight from Last 3 Encounters:   10/29/18 157 lb 6.4 oz (71.4 kg)   08/28/18 157 lb 6.4 oz (71.4 kg)   08/15/18 154 lb (69.9 kg)              Today, you had the following     No orders found for display         Today's Medication Changes          These changes are accurate as of 10/29/18  6:39 PM.  If you have any questions, ask your nurse or doctor.               Stop taking these medicines if you haven't already. Please contact your care team if you have questions.     sertraline 100 MG tablet   Commonly known as:  ZOLOFT   Stopped by:  Kallie Goldberg MD                    Primary Care Provider Office Phone # Fax #    Kallie Goldberg [phone] [address] [facility] [address]        Equal Access to Services     [facility]: Hadii aad ku hadasho Soomaali, waaxda luqadaha, qaybta kaalmada adejaneneyabuck, willard galdamez . So [facility] [phone].    ATENCIÓN: Si habla español, tiene a kuo disposición servicios gratuitos de asistencia lingüística. [facility] [phone].    We comply with applicable federal civil rights laws and [place] laws. We do not discriminate on the basis of race, color, national origin, age, disability, sex, sexual orientation, or gender identity.            Thank you!     Thank you for choosing [facility]  for your care. Our goal is always to provide you with excellent care. Hearing back from our patients is one way we can continue to improve our services. Please take a few minutes to complete the written survey that you may receive in the mail after your visit with us. Thank you!             Your Updated Medication List - Protect others around you: Learn how to safely use, store and throw away your medicines at www.disposemymeds.org.          This list is  accurate as of 10/29/18  6:39 PM.  Always use your most recent med list.                   Brand Name Dispense Instructions for use Diagnosis    albuterol 108 (90 Base) MCG/ACT inhaler    PROAIR HFA/PROVENTIL HFA/VENTOLIN HFA    1 Inhaler    Inhale 2 puffs into the lungs every 4 hours as needed    Cough, Acute bronchospasm       ALPRAZolam 1 MG tablet    XANAX    30 tablet    Take 1 tablet (1 mg) by mouth daily as needed for anxiety    Anxiety       levonorgestrel-ethinyl estradiol 0.1-20 MG-MCG per tablet    AVIANE,ALESSE,LESSINA    84 tablet    Take 1 tablet by mouth daily    Routine postpartum follow-up       QUEtiapine 100 MG tablet    SEROquel     Take 1 tablet by mouth daily

## 2018-10-29 NOTE — NURSING NOTE
"Chief Complaint   Patient presents with     Contraception       Initial /89  Pulse 120  Temp 98.7  F (37.1  C) (Oral)  Resp 14  Wt 157 lb 6.4 oz (71.4 kg)  LMP 10/21/2018  Breastfeeding? No  BMI 28.79 kg/m2 Estimated body mass index is 28.79 kg/(m^2) as calculated from the following:    Height as of 3/19/18: 5' 2\" (1.575 m).    Weight as of this encounter: 157 lb 6.4 oz (71.4 kg).  Medication Reconciliation: complete  Sher Boyd CMA    "

## 2018-10-29 NOTE — LETTER
"                                                                          Affirmation of Informed Consent for Surgery or Invasive Procedure    1.  I, (print patient's name) Jake Gordillo,  1995,   a.  Agree that I will have (include both the medical term and patient words):           Chief Complaint   Patient presents with     Contraception      b.  At [facility].     c.  The reason for this procedure is (medical condition):  IUD.   d.  This will be done or supervised by:  Kallie Goldberg MD.   e.  My doctor may have help from others.  Help could include opening or closing         the wound. Help might also include taking grafts, cutting out tissue,                           implanting devices.  I have been told who will help, if known.     2.  I have talked to my doctor or health care team about:   a.  What the procedure is and what will happen.   b   How it may help me (the benefits).   c.  How it may harm me (the most likely and most serious risks).   d.  The long-term effects the procedure might have.    e.  My other choices for treatment.  The risks and benefits of those choices.    f.   What will likely happen if I say \"no\" to this procedure.    g.  How I might feel right after and how quickly I might be expected to recover.      h.  What medicines will be used to manage pain or sedate me.     3.  I agree that:  (If I do not agree with a statement, I have crossed it out and              initialed next to it.)       a.  I will ask questions.     b.  No one has promised me definite results.    c.  If serious problems are found during the procedure, the treatment may                    change.   d.  If I have \"do not resuscitate\" (DNR) wishes, I have discussed this with my                              doctor and they will be put on hold during the procedure.   e. Students and other appropriate people, approved by the facility, may watch                      the procedure and help with tasks " they are qualified for.                                                    f.  Pictures or videos may be taken. They may be used for medical or                  educational reasons only.       g. Tissues or organs removed from my body as part of the normal course of the                    procedure may be used for research or teaching purposes. They will be                  disposed of with respect.                    h.  If a staff person is exposed to my blood or body fluids, my blood will be drawn                   and tested for HIV and hepatitis.  I understand that by law, the test results will                    go:         -  In my medical record.                         -  To the Employee Occupational Health Service and/or Infection Control                                  at this facility.    -  To the [place] [facility] officials.      i.  I may have a blood transfusion: I have talked to my doctor or care team about:    -  Why I may need a blood transfusion.     - The risks, benefits, and side effects of transfusion - and the risks of not        Having one.     -  Blood safety and other options for treatment.        Consent for blood transfusion obtained during a hospital admission is valid for the entire hospital stay.  Consent  for blood transfusion obtained in the clinic setting is valid for a year from the signature date.                                4.  I understand that:   a.  I can change my mind.  If I do, I must tell my doctor or team as soon as                           possible.              b.  The team members may change during the procedure.                c.   The team will double-check who I am.  They will ask me what I am having                         done and the site of the site of the procedure.  This is done for my safety.    My questions have been answered.  I agree to the procedure.  I have made my special needs and instructions known.      Jake Gordillo      10/29/2018 5:40  PM  Patient (or representative)/Relationship to patient             Date  Time      Witness:  I have verified that the signature is that of the patient or patient's representative and that this has been signed before the procedure:    NAME:        10/29/2018  5:40 PM         Date  Time  Person verifying patient's name or patient representative's signature     Provider:  I have discussed the procedure and the information stated above with the patient (or the patient's representative) and answered their questions. The patient or their representative consented to the procedure:      Kallie Goldberg MD    10/29/2018  5:40 PM  Physician or Provider's Signature(s)   Date  Time       Intepreter (if used):       10/29/2018  5:40 PM                                   Name       Language/Organization Date  Time    Consent for procedure valid for 30 days after patient signature date     [facility]  AFFIRMATION OF INFORMED CONSENT FOR SURGERY OR INVASIVE PROCEDURE               Original - Medical Records

## 2018-10-30 DIAGNOSIS — Z79.899 HIGH RISK MEDICATION USE: Primary | ICD-10-CM

## 2018-10-30 LAB
CHOLEST SERPL-MCNC: 179 MG/DL
ETHANOL UR QL SCN: NEGATIVE
GGT SERPL-CCNC: 27 U/L (ref 0–40)
GLUCOSE SERPL-MCNC: 74 MG/DL (ref 70–99)
HBA1C MFR BLD: 5.4 % (ref 0–5.6)
HDLC SERPL-MCNC: 60 MG/DL
LDLC SERPL CALC-MCNC: 104 MG/DL
NONHDLC SERPL-MCNC: 119 MG/DL
TRIGL SERPL-MCNC: 73 MG/DL

## 2018-10-30 PROCEDURE — 80320 DRUG SCREEN QUANTALCOHOLS: CPT | Performed by: PHYSICIAN ASSISTANT

## 2018-10-30 PROCEDURE — 83036 HEMOGLOBIN GLYCOSYLATED A1C: CPT | Performed by: PHYSICIAN ASSISTANT

## 2018-10-30 PROCEDURE — 36415 COLL VENOUS BLD VENIPUNCTURE: CPT | Performed by: PHYSICIAN ASSISTANT

## 2018-10-30 PROCEDURE — 82977 ASSAY OF GGT: CPT | Performed by: PHYSICIAN ASSISTANT

## 2018-10-30 PROCEDURE — 80307 DRUG TEST PRSMV CHEM ANLYZR: CPT | Performed by: PHYSICIAN ASSISTANT

## 2018-10-30 PROCEDURE — 80061 LIPID PANEL: CPT | Performed by: PHYSICIAN ASSISTANT

## 2018-10-30 PROCEDURE — 82947 ASSAY GLUCOSE BLOOD QUANT: CPT | Performed by: PHYSICIAN ASSISTANT

## 2018-10-31 LAB
ACETAMINOPHEN QUAL: POSITIVE
AMANTADINE: NEGATIVE
AMITRIPTYLINE QUAL: NEGATIVE
AMOXAPINE: NEGATIVE
AMPHETAMINES QUAL: NEGATIVE
ATROPINE: NEGATIVE
BENZODIAZ UR QL: NEGATIVE
CAFFEINE QUAL: POSITIVE
CANNABINOIDS UR QL SCN: NEGATIVE
CARBAMAZEPINE QUAL: NEGATIVE
CHLORPHENIRAMINE: NEGATIVE
CHLORPROMAZINE: NEGATIVE
CITALOPRAM QUAL: NEGATIVE
CLOMIPRAMINE QUAL: NEGATIVE
COCAINE QUAL: NEGATIVE
COCAINE UR QL: NEGATIVE
CODEINE QUAL: NEGATIVE
DESIPRAMINE QUAL: NEGATIVE
DEXTROMETHORPHAN: NEGATIVE
DIPHENHYDRAMINE: NEGATIVE
DOXEPIN/METABOLITE: NEGATIVE
DOXYLAMINE: NEGATIVE
EPHEDRINE OR PSEUDO: NEGATIVE
FENTANYL QUAL: NEGATIVE
FLUOXETINE AND METAB: NEGATIVE
HYDROCODONE QUAL: NEGATIVE
HYDROMORPHONE QUAL: NEGATIVE
IBUPROFEN QUAL: NEGATIVE
IMIPRAMINE QUAL: NEGATIVE
LAMOTRIGINE QUAL: NEGATIVE
LOXAPINE: NEGATIVE
MAPROTYLINE: NEGATIVE
MDMA QUAL: NEGATIVE
MEPERIDINE QUAL: NEGATIVE
METHAMPHETAMINE: NEGATIVE
METHODONE QUAL: NEGATIVE
MORPHINE QUAL: NEGATIVE
NICOTINE: POSITIVE
NORTRIPTYLINE QUAL: NEGATIVE
OLANZAPINE QUAL: NEGATIVE
OPIATES UR QL SCN: NEGATIVE
OXYCODONE QUAL: NEGATIVE
PENTAZOCINE: NEGATIVE
PHENCYCLIDINE QUAL: NEGATIVE
PHENMETRAZINE: NEGATIVE
PHENTERMINE: NEGATIVE
PHENYLBUTAZONE: NEGATIVE
PHENYLPROPANOLAMINE: NEGATIVE
PROPOXPHENE QUAL: NEGATIVE
PROPRANOLOL QUAL: NEGATIVE
PYRILAMINE: NEGATIVE
SALICYLATE QUAL: NEGATIVE
THEOBROMINE: POSITIVE
TOPIRAMATE QUAL: NEGATIVE
TRIMIPRAMINE QUAL: NEGATIVE
VENLAFAXINE QUAL: NEGATIVE

## 2018-11-08 DIAGNOSIS — F41.9 ANXIETY: ICD-10-CM

## 2018-11-08 RX ORDER — ALPRAZOLAM 1 MG
TABLET ORAL
Qty: 30 TABLET | Refills: 0 | OUTPATIENT
Start: 2018-11-08

## 2018-11-08 NOTE — TELEPHONE ENCOUNTER
Controlled Substance Refill Request for alprazolam  Problem List Complete:  No     PROVIDER TO CONSIDER COMPLETION OF PROBLEM LIST AND OVERVIEW/CONTROLLED SUBSTANCE AGREEMENT    Last Written Prescription Date:  9/5/18  Last Fill Quantity: 30,   # refills: 1    Last Office Visit with [facility] primary care provider: 10/29/18    Future Office visit:     Controlled substance agreement on file: No.     Processing:  Fax Rx to [facility] pharmacy   checked in past 3 months?  Yes 11/8/18   Vivi DHALIWALN, RN

## 2018-11-20 ENCOUNTER — OFFICE VISIT (OUTPATIENT)
Dept: OPTOMETRY | Facility: CLINIC | Age: 23
End: 2018-11-20
Payer: COMMERCIAL

## 2018-11-20 DIAGNOSIS — H52.223 REGULAR ASTIGMATISM OF BOTH EYES: Primary | ICD-10-CM

## 2018-11-20 DIAGNOSIS — H04.123 DRY EYES: ICD-10-CM

## 2018-11-20 PROCEDURE — 92015 DETERMINE REFRACTIVE STATE: CPT | Performed by: OPTOMETRIST

## 2018-11-20 PROCEDURE — 92004 COMPRE OPH EXAM NEW PT 1/>: CPT | Performed by: OPTOMETRIST

## 2018-11-20 ASSESSMENT — SLIT LAMP EXAM - LIDS
COMMENTS: NORMAL
COMMENTS: NORMAL

## 2018-11-20 ASSESSMENT — KERATOMETRY
OD_K2POWER_DIOPTERS: 43.00
OS_K2POWER_DIOPTERS: 43.25
OD_K1POWER_DIOPTERS: 42.00
OD_AXISANGLE2_DEGREES: 180
OS_AXISANGLE2_DEGREES: 178
OS_K1POWER_DIOPTERS: 42.25

## 2018-11-20 ASSESSMENT — CONF VISUAL FIELD
OS_NORMAL: 1
OD_NORMAL: 1
METHOD: COUNTING FINGERS

## 2018-11-20 ASSESSMENT — REFRACTION_MANIFEST
OS_CYLINDER: +0.50
OD_AXIS: 070
OS_SPHERE: -0.50
OS_CYLINDER: +0.50
OS_SPHERE: -0.50
OS_AXIS: 108
OD_CYLINDER: +0.25
OD_SPHERE: +0.00
OD_CYLINDER: +0.25
OD_AXIS: 070
METHOD_AUTOREFRACTION: 1
OS_AXIS: 090
OD_SPHERE: PLANO

## 2018-11-20 ASSESSMENT — TONOMETRY
IOP_METHOD: APPLANATION
OD_IOP_MMHG: 14
OS_IOP_MMHG: 14

## 2018-11-20 ASSESSMENT — VISUAL ACUITY
METHOD: SNELLEN - LINEAR
OS_SC: 20/20
OD_SC: 20/25
OD_SC: 20/20
OD_SC+: -1
OS_SC: 20/25

## 2018-11-20 ASSESSMENT — CUP TO DISC RATIO
OD_RATIO: 0.3
OS_RATIO: 0.3

## 2018-11-20 NOTE — MR AVS SNAPSHOT
After Visit Summary   11/20/2018    Jake Gordillo    MRN: [id number]           Patient Information     Date Of Birth          1995        Visit Information        Provider Department      11/20/2018 11:00 AM Judith Vera OD [facility]        Today's Diagnoses     Regular astigmatism of both eyes    -  1    Dry eyes          Care Instructions    Patient was advised of today's exam findings.  Fill glasses prescription  Allow 2 weeks to adapt to glasses  Drink more water  Use over the counter artificial tears 2 times a day (Blink Tears, Systane Ultra or Blink Tears )  Return in 1 year for eye exam    Judith Vera O.D.  [facility]   [address] [place] [address]  [phone]            Follow-ups after your visit        Who to contact     If you have questions or need follow up information about today's clinic visit or your schedule please contact [facility] directly at [phone].  Normal or non-critical lab and imaging results will be communicated to you by Saygenthart, letter or phone within 4 business days after the clinic has received the results. If you do not hear from us within 7 days, please contact the clinic through Saygenthart or phone. If you have a critical or abnormal lab result, we will notify you by phone as soon as possible.  Submit refill requests through Intrusic or call your pharmacy and they will forward the refill request to us. Please allow 3 business days for your refill to be completed.          Additional Information About Your Visit        MyChart Information     Intrusic gives you secure access to your electronic health record. If you see a primary care provider, you can also send messages to your care team and make appointments. If you have questions, please call your primary care clinic.  If you do not have a primary care provider, please call [phone] and they will assist you.        Care EveryWhere ID     This is  your Care EveryWhere ID. This could be used by other organizations to access your [place] medical records  [id number]        Your Vitals Were     Last Period                   10/21/2018            Blood Pressure from Last 3 Encounters:   10/29/18 149/89   08/28/18 135/74   08/15/18 (!) 148/93    Weight from Last 3 Encounters:   10/29/18 71.4 kg (157 lb 6.4 oz)   08/28/18 71.4 kg (157 lb 6.4 oz)   08/15/18 69.9 kg (154 lb)              We Performed the Following     EYE EXAM (SIMPLE-NONBILLABLE)     REFRACTION        Primary Care Provider Office Phone # Fax #    Kallie Mari Goldberg [phone] [address] [facility] [address]        Equal Access to Services     OSKAR THIBODEAUX : Hadii mansi guajardo hadasho Soomaali, waaxda luqadaha, qaybta kaalmada adeegyada, willard galdamez . So [facility] [phone].    ATENCIÓN: Si habla español, tiene a kuo disposición servicios gratuitos de asistencia lingüística. Llame al [phone].    We comply with applicable federal civil rights laws and [place] laws. We do not discriminate on the basis of race, color, national origin, age, disability, sex, sexual orientation, or gender identity.            Thank you!     Thank you for choosing [facility]  for your care. Our goal is always to provide you with excellent care. Hearing back from our patients is one way we can continue to improve our services. Please take a few minutes to complete the written survey that you may receive in the mail after your visit with us. Thank you!             Your Updated Medication List - Protect others around you: Learn how to safely use, store and throw away your medicines at www.disposemymeds.org.          This list is accurate as of 11/20/18 11:54 AM.  Always use your most recent med list.                   Brand Name Dispense Instructions for use Diagnosis    albuterol 108 (90 Base) MCG/ACT inhaler    PROAIR HFA/PROVENTIL HFA/VENTOLIN HFA     1 Inhaler    Inhale 2 puffs into the lungs every 4 hours as needed    Cough, Acute bronchospasm       ALPRAZolam 1 MG tablet    XANAX    30 tablet    Take 1 tablet (1 mg) by mouth daily as needed for anxiety    Anxiety       levonorgestrel 20 MCG/24HR IUD    MIRENA     1 each (20 mcg) by Intrauterine route continuous    Encounter for insertion of intrauterine contraceptive device       levonorgestrel-ethinyl estradiol 0.1-20 MG-MCG per tablet    AVIANE,ALESSE,LESSINA    84 tablet    Take 1 tablet by mouth daily    Routine postpartum follow-up       QUEtiapine 100 MG tablet    SEROquel     Take 1 tablet by mouth daily

## 2018-11-20 NOTE — LETTER
11/20/2018         RE: Jake Gordillo  [address] [facility] [address]        Dear Colleague,    Thank you for referring your patient, Jake Gordillo, to the [facility]. Please see a copy of my visit note below.    Chief Complaint   Patient presents with     COMPREHENSIVE EYE EXAM         Last Eye Exam: 10+ years   Dilated Previously: Yes    What are you currently using to see?  does not use glasses or contacts       Distance Vision Acuity: Noticed gradual change in both eyes, trouble with signs and her clock     Near Vision Acuity: Satisfied with vision while reading  Unaided, also noticing that its a little harder to focus on electronics     Eye Comfort: good for the most part, they do get irritated/sore when she driving at night  Do you use eye drops? : No- does not like drops  Occupation or Hobbies:  at [employer] - more discomfort with use of bleach at work     Fabi Gonzalez Optometric Assistant           Medical, surgical and family histories reviewed and updated 11/20/2018.       OBJECTIVE: See Ophthalmology exam    ASSESSMENT:    ICD-10-CM    1. Regular astigmatism of both eyes H52.223 EYE EXAM (SIMPLE-NONBILLABLE)     REFRACTION   2. Dry eyes H04.123 EYE EXAM (SIMPLE-NONBILLABLE)     REFRACTION      PLAN:   Humidifier in room discussed since artificial tear use difficult for her  Patient Instructions   Patient was advised of today's exam findings.  Fill glasses prescription  Allow 2 weeks to adapt to glasses  Drink more water  Use over the counter artificial tears 2 times a day (Blink Tears, Systane Ultra or Blink Tears )  Return in 1 year for eye exam    Judith Vera O.D.  [facility]   [address] [place] [id number] [phone]           Again, thank you for allowing me to participate in the care of your patient.        Sincerely,        Judith Vera, OD

## 2018-11-20 NOTE — PATIENT INSTRUCTIONS
Patient was advised of today's exam findings.  Fill glasses prescription  Allow 2 weeks to adapt to glasses  Drink more water  Use over the counter artificial tears 2 times a day (Blink Tears, Systane Ultra or Blink Tears )  Return in 1 year for eye exam    Judith Vera O.D.  [facility]   [address] [place], [place] [id number] [phone]

## 2018-11-20 NOTE — PROGRESS NOTES
Chief Complaint   Patient presents with     COMPREHENSIVE EYE EXAM         Last Eye Exam: 10+ years   Dilated Previously: Yes    What are you currently using to see?  does not use glasses or contacts       Distance Vision Acuity: Noticed gradual change in both eyes, trouble with signs and her clock     Near Vision Acuity: Satisfied with vision while reading  Unaided, also noticing that its a little harder to focus on electronics     Eye Comfort: good for the most part, they do get irritated/sore when she driving at night  Do you use eye drops? : No- does not like drops  Occupation or Hobbies:  at [employer] - more discomfort with use of bleach at work     Fabi Apple Optometric Assistant           Medical, surgical and family histories reviewed and updated 11/20/2018.       OBJECTIVE: See Ophthalmology exam    ASSESSMENT:    ICD-10-CM    1. Regular astigmatism of both eyes H52.223 EYE EXAM (SIMPLE-NONBILLABLE)     REFRACTION   2. Dry eyes H04.123 EYE EXAM (SIMPLE-NONBILLABLE)     REFRACTION      PLAN:   Humidifier in room discussed since artificial tear use difficult for her  Patient Instructions   Patient was advised of today's exam findings.  Fill glasses prescription  Allow 2 weeks to adapt to glasses  Drink more water  Use over the counter artificial tears 2 times a day (Blink Tears, Systane Ultra or Blink Tears )  Return in 1 year for eye exam    Judith Vera O.D.  [facility]   [address] [place] [address] [phone]

## 2018-12-03 ENCOUNTER — OFFICE VISIT (OUTPATIENT)
Dept: OPTOMETRY | Facility: CLINIC | Age: 23
End: 2018-12-03
Payer: COMMERCIAL

## 2018-12-03 DIAGNOSIS — H52.223 REGULAR ASTIGMATISM OF BOTH EYES: Primary | ICD-10-CM

## 2018-12-03 DIAGNOSIS — H52.12 MYOPIA, LEFT: ICD-10-CM

## 2018-12-03 PROCEDURE — 99207 ZZC NO BILLABLE SERVICE THIS VISIT: CPT | Performed by: OPTOMETRIST

## 2018-12-03 ASSESSMENT — REFRACTION_MANIFEST
OS_AXIS: 109
OD_AXIS: 081
OS_CYLINDER: +0.50
OD_SPHERE: -0.25
OD_CYLINDER: +0.50
OD_AXIS: 075
OS_SPHERE: -0.50
METHOD_AUTOREFRACTION: 1
OS_CYLINDER: +0.50
OS_SPHERE: -0.75
OD_CYLINDER: +0.50
OD_SPHERE: -0.25
OS_AXIS: 115

## 2018-12-03 ASSESSMENT — REFRACTION_WEARINGRX
OD_CYLINDER: +0.25
OD_SPHERE: 0.00
OD_AXIS: 070
SPECS_TYPE: SVL
OS_CYLINDER: +0.50
OS_AXIS: 108
OS_SPHERE: -0.50

## 2018-12-03 ASSESSMENT — VISUAL ACUITY
OD_SC+: +1
OD_SC: 20/40
OD_CC+: -2
CORRECTION_TYPE: GLASSES
METHOD: SNELLEN - LINEAR
OD_CC: 20/20
OD_CC: 20/20
OS_CC: 20/20
OS_CC: 20/20
OS_SC: 20/30

## 2018-12-03 ASSESSMENT — KERATOMETRY
OD_AXISANGLE2_DEGREES: 4
OS_K1POWER_DIOPTERS: 42.00
OD_K2POWER_DIOPTERS: 43.25
OS_K2POWER_DIOPTERS: 43.25
OS_AXISANGLE2_DEGREES: 6
OD_K1POWER_DIOPTERS: 42.00

## 2018-12-03 NOTE — PATIENT INSTRUCTIONS
Allow 2 weeks to adapt to change in glasses.  Return to clinic as needed.    Judith Vera O.D.  [facility]   [address] [place], [place] [id number] [phone]

## 2018-12-03 NOTE — MR AVS SNAPSHOT
After Visit Summary   12/3/2018    Jake Gordillo    MRN: [id number]           Patient Information     Date Of Birth          1995        Visit Information        Provider Department      12/3/2018 1:20 PM Judith Vera OD [facility]        Care Instructions    Allow 2 weeks to adapt to change in glasses.  Return to clinic as needed.    Judith Vera O.D.  [facility]   [address] [place] [address]  [phone]            Follow-ups after your visit        Who to contact     If you have questions or need follow up information about today's clinic visit or your schedule please contact [facility] directly at [phone].  Normal or non-critical lab and imaging results will be communicated to you by MyChart, letter or phone within 4 business days after the clinic has received the results. If you do not hear from us within 7 days, please contact the clinic through Mobshophart or phone. If you have a critical or abnormal lab result, we will notify you by phone as soon as possible.  Submit refill requests through Switchable Solutions or call your pharmacy and they will forward the refill request to us. Please allow 3 business days for your refill to be completed.          Additional Information About Your Visit        MyChart Information     [employer] Solutions gives you secure access to your electronic health record. If you see a primary care provider, you can also send messages to your care team and make appointments. If you have questions, please call your primary care clinic.  If you do not have a primary care provider, please call [phone] and they will assist you.        Care EveryWhere ID     This is your Care EveryWhere ID. This could be used by other organizations to access your [place] medical records  [id number]         Blood Pressure from Last 3 Encounters:   10/29/18 149/89   08/28/18 135/74   08/15/18 (!) 148/93    Weight from Last 3 Encounters:    10/29/18 71.4 kg (157 lb 6.4 oz)   08/28/18 71.4 kg (157 lb 6.4 oz)   08/15/18 69.9 kg (154 lb)              Today, you had the following     No orders found for display       Primary Care Provider Office Phone # Fax #    Kallie Mari Goldberg [phone] [address] [facility] [address]        Equal Access to Services     [facility] : Hadii aad ku hadasho Soomaali, waaxda luqadaha, qaybta kaalmada adeegyada, waxay idiin hayaan adeeg kharacarissa labambi . So [facility] [phone].    ATENCIÓN: Si naseemla espsisi, tiene a kuo disposición servicios gratuitos de asistencia lingüística. [facility] [phone].    We comply with applicable federal civil rights laws and [place] laws. We do not discriminate on the basis of race, color, national origin, age, disability, sex, sexual orientation, or gender identity.            Thank you!     Thank you for choosing [facility]  for your care. Our goal is always to provide you with excellent care. Hearing back from our patients is one way we can continue to improve our services. Please take a few minutes to complete the written survey that you may receive in the mail after your visit with us. Thank you!             Your Updated Medication List - Protect others around you: Learn how to safely use, store and throw away your medicines at www.disposemymeds.org.          This list is accurate as of 12/3/18  1:44 PM.  Always use your most recent med list.                   Brand Name Dispense Instructions for use Diagnosis    albuterol 108 (90 Base) MCG/ACT inhaler    PROAIR HFA/PROVENTIL HFA/VENTOLIN HFA    1 Inhaler    Inhale 2 puffs into the lungs every 4 hours as needed    Cough, Acute bronchospasm       ALPRAZolam 1 MG tablet    XANAX    30 tablet    Take 1 tablet (1 mg) by mouth daily as needed for anxiety    Anxiety       levonorgestrel 20 MCG/24HR IUD    MIRENA     1 each (20 mcg) by Intrauterine route continuous    Encounter for insertion  of intrauterine contraceptive device       levonorgestrel-ethinyl estradiol 0.1-20 MG-MCG tablet    AVIANE/ALESSE/LESSINA    84 tablet    Take 1 tablet by mouth daily    Routine postpartum follow-up       QUEtiapine 100 MG tablet    SEROquel     Take 1 tablet by mouth daily

## 2018-12-03 NOTE — PROGRESS NOTES
Chief Complaint   Patient presents with     New Eval For Glasses     Rx check, glasses        Source of glasses: Had them made at [facility]   How long have you tried the glasses:She's had them since last Wednesday, just under a week.   What is not working with glasses: They help a little, but signs and faces off in the distance aren't good. Also unable to see her alarm clock which has blue light background and she said that it is really blurry     Fabi Gonzalez Optometric Assistant       OBJECTIVE: See Ophthalmology exam    ASSESSMENT:    ICD-10-CM    1. Regular astigmatism of both eyes H52.223    2. Myopia, left H52.12      PLAN:  Patient Instructions   Allow 2 weeks to adapt to change in glasses.  Return to clinic as needed.    Judith Vera O.D.  [facility]   [address] [place] [id number] [phone]

## 2019-01-18 ENCOUNTER — TELEPHONE (OUTPATIENT)
Dept: FAMILY MEDICINE | Facility: CLINIC | Age: 24
End: 2019-01-18

## 2019-01-18 NOTE — LETTER
February 7, 2019    Jake Gordillo  [address] [facility] [address]      Dear Jake,       After reviewing your chart, I have determined you are due for a PHQ-9 questionnaire. The PHQ-9 is a nine question survey tool that helps us determine how your depression is doing based on the last two weeks. Please complete the questionnaire and return in the envelope provided.          Thank you,   [facility]  [phone]

## 2019-01-18 NOTE — TELEPHONE ENCOUNTER
PHQ-9 due now for patient ( 4-8 months from index date)  Index date:       8-28-18  Index PHQ9 :   17  FU start date:   12-28-18  FU End date :   4-28-19    RN- Contact patient for PHQ-9. Remission considered if follow up PHQ-9 less than 5.  If greater than 5 consider follow up appointment, e-visit for medication follow up and evaluation.

## 2019-01-29 NOTE — TELEPHONE ENCOUNTER
Left message on answering machine for patient/parent to call back.   [phone].  Jahaira Mena RN

## 2019-02-01 NOTE — TELEPHONE ENCOUNTER
Weilos message sent to pt has not yet been read by pt.    Attempted to reach pt. There was no answer. LM to return call to RN at [phone].  Razia Ceja RN

## 2019-09-30 ENCOUNTER — OFFICE VISIT (OUTPATIENT)
Dept: URGENT CARE | Facility: URGENT CARE | Age: 24
End: 2019-09-30
Payer: COMMERCIAL

## 2019-09-30 ENCOUNTER — ANCILLARY PROCEDURE (OUTPATIENT)
Dept: GENERAL RADIOLOGY | Facility: CLINIC | Age: 24
End: 2019-09-30
Attending: NURSE PRACTITIONER
Payer: COMMERCIAL

## 2019-09-30 VITALS
SYSTOLIC BLOOD PRESSURE: 162 MMHG | DIASTOLIC BLOOD PRESSURE: 92 MMHG | OXYGEN SATURATION: 96 % | HEIGHT: 62 IN | TEMPERATURE: 98.7 F | WEIGHT: 157 LBS | BODY MASS INDEX: 28.89 KG/M2 | HEART RATE: 116 BPM

## 2019-09-30 DIAGNOSIS — J40 BRONCHITIS: Primary | ICD-10-CM

## 2019-09-30 DIAGNOSIS — R05.9 COUGH: ICD-10-CM

## 2019-09-30 DIAGNOSIS — R03.0 ELEVATED BP WITHOUT DIAGNOSIS OF HYPERTENSION: ICD-10-CM

## 2019-09-30 PROCEDURE — 99214 OFFICE O/P EST MOD 30 MIN: CPT | Mod: 25 | Performed by: NURSE PRACTITIONER

## 2019-09-30 PROCEDURE — 71046 X-RAY EXAM CHEST 2 VIEWS: CPT

## 2019-09-30 PROCEDURE — 94640 AIRWAY INHALATION TREATMENT: CPT | Performed by: NURSE PRACTITIONER

## 2019-09-30 RX ORDER — ALBUTEROL SULFATE 90 UG/1
2 AEROSOL, METERED RESPIRATORY (INHALATION) EVERY 6 HOURS
Qty: 18 G | Refills: 0 | Status: SHIPPED | OUTPATIENT
Start: 2019-09-30

## 2019-09-30 RX ORDER — PROPRANOLOL HYDROCHLORIDE 20 MG/1
20 TABLET ORAL
COMMUNITY
Start: 2019-09-05

## 2019-09-30 RX ORDER — IPRATROPIUM BROMIDE AND ALBUTEROL SULFATE 2.5; .5 MG/3ML; MG/3ML
3 SOLUTION RESPIRATORY (INHALATION) ONCE
Status: COMPLETED | OUTPATIENT
Start: 2019-09-30 | End: 2019-09-30

## 2019-09-30 RX ORDER — LURASIDONE HYDROCHLORIDE 40 MG/1
40 TABLET, FILM COATED ORAL
Refills: 1 | COMMUNITY
Start: 2019-09-25

## 2019-09-30 RX ORDER — PREDNISONE 20 MG/1
20 TABLET ORAL 2 TIMES DAILY
Qty: 10 TABLET | Refills: 0 | Status: SHIPPED | OUTPATIENT
Start: 2019-09-30 | End: 2019-10-05

## 2019-09-30 RX ADMIN — IPRATROPIUM BROMIDE AND ALBUTEROL SULFATE 3 ML: 2.5; .5 SOLUTION RESPIRATORY (INHALATION) at 18:16

## 2019-09-30 ASSESSMENT — MIFFLIN-ST. JEOR: SCORE: 1405.4

## 2019-09-30 NOTE — PROGRESS NOTES
SUBJECTIVE:   Jake Gordillo is a 24 year old female presenting with a chief complaint of cough - non-productive.  Onset of symptoms was 1 week(s) ago.  Course of illness is worsening.    Severity moderate  Current and Associated symptoms: wheezing and shortness of breath  Treatment measures tried include Decongestants, OTC Cough med, Fluids and Rest.  Predisposing factors include tobacco use.    Past Medical History:   Diagnosis Date     Anxiety disorder      Current Outpatient Medications   Medication Sig Dispense Refill     ALPRAZolam (XANAX) 1 MG tablet Take 1 tablet (1 mg) by mouth daily as needed for anxiety 30 tablet 1     LATUDA 40 MG TABS tablet 40 mg  1     levonorgestrel (MIRENA) 20 MCG/24HR IUD 1 each (20 mcg) by Intrauterine route continuous       propranolol (INDERAL) 20 MG tablet 20 mg       albuterol (PROAIR HFA/PROVENTIL HFA/VENTOLIN HFA) 108 (90 Base) MCG/ACT inhaler Inhale 2 puffs into the lungs every 4 hours as needed (Patient not taking: Reported on 9/30/2019) 1 Inhaler 0     levonorgestrel-ethinyl estradiol (AVIANE,ALESSE,LESSINA) 0.1-20 MG-MCG per tablet Take 1 tablet by mouth daily (Patient not taking: Reported on 9/30/2019) 84 tablet 1     QUEtiapine (SEROQUEL) 100 MG tablet Take 1 tablet by mouth daily       Social History     Tobacco Use     Smoking status: Current Every Day Smoker     Smokeless tobacco: Never Used     Tobacco comment: nonsmoking household   Substance Use Topics     Alcohol use: No       ROS:  CONSTITUTIONAL:NEGATIVE for fever, chills, change in weight  INTEGUMENTARY/SKIN: NEGATIVE for worrisome rashes, moles or lesions  EYES: NEGATIVE for vision changes or irritation  ENT/MOUTH: NEGATIVE for ear, mouth and throat problems  RESP:POSITIVE for cough-productive, SOB/dyspnea and wheezing, hx smoker  CV: NEGATIVE for chest pain, palpitations or peripheral edema  GI: NEGATIVE for nausea, abdominal pain, heartburn, or change in bowel habits  : normal menstrual  "cycles  MUSCULOSKELETAL: NEGATIVE for significant arthralgias or myalgia  NEURO: NEGATIVE for weakness, dizziness or paresthesias  ENDOCRINE: NEGATIVE for temperature intolerance, skin/hair changes  HEME/ALLERGY/IMMUNE: NEGATIVE for bleeding problems  PSYCHIATRIC: NEGATIVE for changes in mood or affect    OBJECTIVE:  BP (!) 162/92   Pulse 116   Temp 98.7  F (37.1  C) (Oral)   Ht 1.575 m (5' 2\")   Wt 71.2 kg (157 lb)   SpO2 96%   Breastfeeding? No   BMI 28.72 kg/m    GENERAL APPEARANCE: alert and no distress  EYES: EOMI,  PERRL, conjunctiva clear  HENT: ear canals and TM's normal.  Nose and mouth without ulcers, erythema or lesions  NECK: supple, nontender, no lymphadenopathy  RESP: expiratory wheezes throughout  CV: regular rates and rhythm, normal S1 S2, no murmur noted  ABDOMEN:  soft, nontender, no HSM or masses and bowel sounds normal  NEURO: Normal strength and tone, sensory exam grossly normal,  normal speech and mentation  SKIN: no suspicious lesions or rashes  PSYCH: mentation appears normal  LYMPHATICS: no cervical adenopathy    XRAY CHEST  Negative for infiltrates reviewed by myself    Pending radiology read  ASSESSMENT:  (J40) Bronchitis  (primary encounter diagnosis)    Plan:   Duoneb given in clinic with relief  RX- prednisone, albuterol prn  Fluids, rest, humidifier supportive cares advised  Home treat and monitor symptoms call or rtc if worsening or not improving    (R03.0) Elevated BP without diagnosis of hypertension  Plan: Denyele to follow up with Primary Care provider regarding elevated blood pressure.      Isha Oneal APRN CNP    "

## 2019-09-30 NOTE — PATIENT INSTRUCTIONS
Patient Education     Acute Bronchitis  Your healthcare provider has told you that you have acute bronchitis. Bronchitis is infection or inflammation of the bronchial tubes (airways in the lungs). Normally, air moves easily in and out of the airways. Bronchitis narrows the airways, making it harder for air to flow in and out of the lungs. This causes symptoms such as shortness of breath, coughing up yellow or green mucus, and wheezing. Bronchitis can be acute or chronic. Acute means the condition comes on quickly and goes away in a short time, usually within 3 to 10 days. Chronic means a condition lasts a long time and often comes back.    What causes acute bronchitis?  Acute bronchitis almost always starts as a viral respiratory infection, such as a cold or the flu. Certain factors make it more likely for a cold or flu to turn into bronchitis. These include being very young, being elderly, having a heart or lung problem, or having a weak immune system. Cigarette smoking also makes bronchitis more likely.  When bronchitis develops, the airways become swollen. The airways may also become infected with bacteria. This is known as a secondary infection.  Diagnosing acute bronchitis  Your healthcare provider will examine you and ask about your symptoms and health history. You may also have a sputum culture to test the fluid in your lungs. Chest X-rays may be done to look for infection in the lungs.  Treating acute bronchitis  Bronchitis usually clears up as the cold or flu goes away. You can help feel better faster by doing the following:    Take medicine as directed. You may be told to take ibuprofen or other over-the-counter medicines. These help relieve inflammation in your bronchial tubes. Your healthcare provider may prescribe an inhaler to help open up the bronchial tubes. Most of the time, acute bronchitis is caused by a viral infection. Antibiotics are usually not prescribed for viral infections.    Drink  plenty of fluids, such as water, juice, or warm soup. Fluids loosen mucus so that you can cough it up. This helps you breathe more easily. Fluids also prevent dehydration.    Make sure you get plenty of rest.    Do not smoke. Do not allow anyone else to smoke in your home.  Recovery and follow-up  Follow up with your doctor as you are told. You will likely feel better in a week or two. But a dry cough can linger beyond that time. Let your doctor know if you still have symptoms (other than a dry cough) after 2 weeks, or if you re prone to getting bronchial infections. Take steps to protect yourself from future infections. These steps include stopping smoking and avoiding tobacco smoke, washing your hands often, and getting a yearly flu shot.  When to call your healthcare provider  Call the healthcare provider if you have any of the following:    Fever of 100.4 F (38.0 C) or higher, or as advised    Symptoms that get worse, or new symptoms    Trouble breathing    Symptoms that don t start to improve within a week, or within 3 days of taking antibiotics   Date Last Reviewed: 12/1/2016 2000-2018 [employer] [address] [place] [address], [place] [address]. All rights reserved. This information is not intended as a substitute for professional medical care. Always follow your healthcare professional's instructions.         Jake to follow up with Primary Care provider regarding elevated blood pressure.

## 2020-02-23 ENCOUNTER — HEALTH MAINTENANCE LETTER (OUTPATIENT)
Age: 25
End: 2020-02-23

## 2020-03-13 ENCOUNTER — VIRTUAL VISIT (OUTPATIENT)
Dept: FAMILY MEDICINE | Facility: OTHER | Age: 25
End: 2020-03-13

## 2020-03-19 NOTE — PROGRESS NOTES
"Date: 2020 16:29:12  Clinician: Francesca Meeks  Clinician NPI: [id number]  Patient: Jake Kennedy  Patient : 1995  Patient Address: [address] [place] [id number]  Patient Phone: [phone]  Visit Protocol: URI  Patient Summary:  Jake is a 25 year old ( : 1995 ) female who initiated a Visit for COVID-19 (Coronavirus) evaluation and screening. When asked the question \"Please sign me up to receive news, health information and promotions. \", Jake responded \"No\".    Jake states her symptoms started 1-2 days ago.   Her symptoms consist of a sore throat, a cough, nasal congestion, ear pain, malaise, a headache, and rhinitis. She is experiencing mild difficulty breathing with activities but can speak normally in full sentences.   Symptom details     Nasal secretions: The color of her mucus is green.    Cough: Jake coughs every 5-10 minutes and her cough is more bothersome at night. Phlegm comes into her throat when she coughs. She does not believe her cough is caused by post-nasal drip. The color of the phlegm is yellow.     Sore throat: Jake reports having moderate throat pain (4-6 on a 10 point pain scale), does not have exudate on her tonsils, and can swallow liquids. The lymph nodes in her neck are not enlarged. A rash has not appeared on the skin since the sore throat started.     Headache: She states the headache is moderate (4-6 on a 10 point pain scale).      Jake denies having chills, wheezing, fever, teeth pain, enlarged lymph nodes, facial pain or pressure, and myalgias. She also denies taking antibiotic medication for the symptoms, having recent facial or sinus surgery in the past 60 days, and having a sinus infection within the past year.   Precipitating events  Within the past week, Jake has not been exposed to someone with strep throat. She has not recently been exposed to someone with influenza. Jake has been in close contact with the following " high risk individuals: children under the age of 5 and people with asthma, heart disease or diabetes.   Pertinent COVID-19 (Coronavirus) information  Jake has not traveled internationally or to the areas where COVID-19 (Coronavirus) is widespread in the last 14 days before the start of her symptoms.   Jake has not had close contact with a laboratory-confirmed positive COVID-19 patient or someone under quarantine for suspected COVID-19 within 14 days of symptom onset.   Jake is not a healthcare worker or does not work in a healthcare facility.   Pertinent medical history  Jake does not get yeast infections when she takes antibiotics.   Jake does not need a return to work/school note.   Weight: 160 lbs   Jake smokes or uses smokeless tobacco.   She denies pregnancy and denies breastfeeding. She has menstruated in the past month.   Weight: 160 lbs    MEDICATIONS: No current medications, ALLERGIES: NKDA  Clinician Response:  Dear Jake,  Based on the information provided, you have a viral upper respiratory infection, otherwise known as a cold. Symptoms vary from person to person, but can include sneezing, coughing, a runny nose, sore throat, and headache and range from mild to severe.  Unfortunately, there are no medications that can cure a cold, so treatment is focused on controlling symptoms as much as possible. Most people gradually feel better until symptoms are gone in 1-2 weeks.  Medication information  Because you have a viral infection, antibiotics will not help you get better. Treating a viral infection with antibiotics could actually make you feel worse.  Unless you are allergic to the over-the-counter medication(s) below, I recommend using:       Acetaminophen (Tylenol or store brand) oral tablet. Take 1-2 tablets by mouth every 4-6 hours to help with the discomfort.      Ibuprofen (Advil or store brand) 200 mg oral tablet. Take 1-3 tablets (200-600 mg) by mouth every 8 hours to help with  the discomfort. Make sure to take the ibuprofen with food. Do not exceed 2400 mg in 24 hours.    A decongestant such as Sudafed PE or store brand.      Saline nasal spray (Linn or store brand). Use 1-2 sprays in each nostril 3 times a day as needed for congestion.     Over-the-counter medications do not require a prescription. Ask the pharmacist if you have any questions.  Self care  The following tips will keep you as comfortable as possible while you recover:     Rest    Drink plenty of water and other liquids    Take a hot shower to loosen congestion    Use throat lozenges    Gargle with warm salt water (1/4 teaspoon of salt per 8 ounce glass of water)    Suck on frozen items such as popsicles or ice cubes    Drink hot tea with lemon and honey    Take a spoonful of honey to reduce your cough     Also, as your provider, I need you to know that becoming tobacco-free is the most important thing you can do to protect your current and future health.  When to seek care  Please be seen in a clinic or urgent care if new symptoms develop, or symptoms become worse.  Call 911 or go to the emergency room if you feel that your throat is closing off, you suddenly develop a rash, you are unable to swallow fluids, you are drooling, or you are having difficulty breathing.  Additional treatment plan      Dear Jake,  Based on the information you have provided, it does not appear you need Coronavirus (COVID-19) testing.   At this time, we recommend testing primarily for those people who have symptoms of cough and fever and have either traveled to a known area of infection or have been exposed to someone with laboratory confirmed Coronavirus by close contact.   Coronavirus - General Information:   The coronavirus infection starts within 14 days of an exposure.  Symptoms are those of a respiratory infection (such as fever, cough).   If you have not had symptoms by day 15, you should be considered uninfected by coronavirus.    Coronavirus - Symptoms:    The coronavirus can cause a respiratory illness, such as bronchitis or pneumonia.  The most common symptoms are: cough, fever, and shortness of breath.   Other symptoms are: body aches, chills, diarrhea, fatigue, headache, runny nose, and sore throat   Coronavirus - Exposure Risk Factors:   Exposure to a person who has been diagnosed with coronavirus.  Travel from an area with recent local transmission of coronavirus.  The CDC (www.cdc.gov) has the most up-to-date list of where the coronavirus outbreak is occurring.   Coronavirus - Spreading:    The virus likely spreads through respiratory droplets produced when a person coughs or sneezes. These respiratory droplets can travel approximately 6 feet and can remain on surfaces. Common disinfectants will kill the virus.  The CDC currently does not recommend healthy people wear masks.   Coronavirus - Protect Yourself:    Avoid close contact with people known to have this new coronavirus infection.  Wash hands often with soap and water or alcohol-based hand .  Avoid touching the eyes, nose or mouth.   Thank you for limiting contact with others, wearing a simple mask to cover your cough, practice good hand hygiene habits and accessing our virtual services where possible to limit the spread of this virus.  For more information about COVID19 and options for caring for yourself at home, please visit the CDC website at https://www.cdc.gov/coronavirus/2019-ncov/about/steps-when-sick.html   For more options for care at [facility], please visit our website at [email or web address]     COVID-19 (Coronavirus) General Information  With the increase in the number of COVID-19 (Coronavirus) cases, we understand you may have some questions. Below is some helpful information on COVID-19 (Coronavirus).  How can I protect myself and others from the COVID-19 (Coronavirus)?  Because there is currently no vaccine to prevent  infection, the best way to protect yourself is to avoid being exposed to this virus. Put distance between yourself and other people if COVID-19 (Coronavirus) is spreading in your community. The virus is thought to spread mainly from person-to-person.     Between people who are in close contact with one another (within about 6 about) for prolonged period (10 minutes or longer).    Through respiratory droplets produced when an infected person coughs or sneezes.     The CDC recommends the following additional steps to protect yourself and others:     Wash your hands often with soap and water for at least 20 seconds, especially after blowing your nose, coughing, or sneezing; going to the bathroom; and before eating or preparing food.  Use an alcohol-based hand  that contains at least 60 percent alcohol if soap and water are not available.        Avoid touching your eyes, nose and mouth with unwashed hands.    Avoid close contact with people who are sick.    Stay home when you are sick.    Cover your cough or sneeze with a tissue, then throw the tissue in the trash.    Clean and disinfect frequently touched objects and surfaces.     You can help stop COVID-19 (Coronavirus) by knowing the signs and symptoms:     Fever    Cough    Shortness of breath     Contact your healthcare provider if   Develop symptoms   AND   Have been in close contact with a person known to have COVID-19 (Coronavirus) or live in or have recently traveled from an area with ongoing spread of COVID-19 (Coronavirus). Call ahead before you go to a doctor's office or emergency room. Tell them about your recent travel and your symptoms.   For the most up to date information, visit the CDC's website.  Steps to help prevent the spread of COVID-19 (Coronavirus) if you are sick  If you are sick with COVID-19 (Coronavirus) or suspect you are infected with the virus that causes COVID-19 (Coronavirus), follow the steps below to help prevent the disease  "from spreading&nbsp;to people in your home and community.     Stay home except to get medical care. Home isolation may be started in consultation with your healthcare clinician.    Separate yourself from other people and animals in your home.    Call ahead before visiting your doctor if you have a medical appointment.    Wear a facemask when you are around other people.    Cover your cough and sneezes.    Clean your hands often.    Avoid sharing personal household items.    Clean and disinfect frequently touched objects and surfaces everyday.    You will need to have someone drop off medications or household supplies (if needed) at your house without coming inside or in contact with you or others living in your house.    Monitor your symptoms and seek prompt medical care if your illness is worsening (e.g. Difficulty breathing).    Discontinue home isolation only in consultation with your healthcare provider.     For more detailed and up to date information on what to do if you are sick, visit this link: What to Do If You Are Sick With Coronavirus Disease 2019 (COVID-19).  Do I need to be tested for COVID-19 (Coronavirus)?     At this time, the limited number of tests available are controlled by the state and local health departments and are being reserved for more seriously ill patients, those with known exposure to confirmed patients, and those with recent travel (within 14 days) to countries with high rates of COVID-19 (Coronavirus).    Decisions on which patients receive testing will be based on the local spread of COVID-19 (Coronavirus) as well as the symptoms. Your healthcare provider will make the final decision on whether you should be tested.    In the meantime, if you have concerns that you may have been exposed, it is reasonable to practice \"social distancing.\"&nbsp; If you are ill with a cold or flu-like illness, please monitor your symptoms and reach out to your healthcare provider if your symptoms " worsen.    For more up to date information, visit this link: COVID-19 (Coronavirus) Frequently Asked Questions and Answers.      Diagnosis: Cough  Diagnosis ICD: R05

## 2020-12-13 ENCOUNTER — HEALTH MAINTENANCE LETTER (OUTPATIENT)
Age: 25
End: 2020-12-13

## 2021-01-15 ENCOUNTER — HEALTH MAINTENANCE LETTER (OUTPATIENT)
Age: 26
End: 2021-01-15

## 2021-04-17 ENCOUNTER — HEALTH MAINTENANCE LETTER (OUTPATIENT)
Age: 26
End: 2021-04-17

## 2021-09-26 ENCOUNTER — HEALTH MAINTENANCE LETTER (OUTPATIENT)
Age: 26
End: 2021-09-26

## 2022-05-08 ENCOUNTER — HEALTH MAINTENANCE LETTER (OUTPATIENT)
Age: 27
End: 2022-05-08

## 2023-01-08 ENCOUNTER — HEALTH MAINTENANCE LETTER (OUTPATIENT)
Age: 28
End: 2023-01-08

## 2023-01-31 NOTE — MR AVS SNAPSHOT
After Visit Summary   1/4/2018    Jake Gordillo    MRN: [id number]           Patient Information     Date Of Birth          1995        Visit Information        Provider Department      1/4/2018 3:15 PM Raad Pennington MD [facility]        Today's Diagnoses     Cough    -  1       Follow-ups after your visit        Your next 10 appointments already scheduled     Richard 15, 2018  3:40 PM CST   ESTABLISHED PRENATAL with Kallie Goldberg MD   [facility]    [id number] [facility] [address] [phone]            Feb 12, 2018  5:20 PM CST   ESTABLISHED PRENATAL with Kallie Goldberg MD   [facility]    [address] [facility] [address] [phone]              Who to contact     If you have questions or need follow up information about today's clinic visit or your schedule please contact [facility] directly at [phone].  Normal or non-critical lab and imaging results will be communicated to you by [employer], letter or phone within 4 business days after the clinic has received the results. If you do not hear from us within 7 days, please contact the clinic through [employer] or phone. If you have a critical or abnormal lab result, we will notify you by phone as soon as possible.  Submit refill requests through [employer] or call your pharmacy and they will forward the refill request to us. Please allow 3 business days for your refill to be completed.          Additional Information About Your Visit        [employer] [facility] Information     [employer] gives you secure access to your electronic health record. If you see a primary care provider, you can also send messages to your care team and make appointments. If you have questions, please call your primary care clinic.  If you do not have a primary care provider, please call [phone] and they will assist you.        Care  "EveryWhere ID     This is your Care EveryWhere ID. This could be used by other organizations to access your [place] medical records  [id number]        Your Vitals Were     Pulse Temperature Height Last Period Pulse Oximetry BMI (Body Mass Index)    127 98.3  F (36.8  C) (Oral) 5' 2\" (1.575 m) 09/11/2017 97% 26.34 kg/m2       Blood Pressure from Last 3 Encounters:   01/04/18 130/82   12/19/17 145/83   11/06/17 130/80    Weight from Last 3 Encounters:   01/04/18 144 lb (65.3 kg)   12/19/17 150 lb (68 kg)   11/06/17 144 lb (65.3 kg)              We Performed the Following     Influenza A/B antigen          Today's Medication Changes          These changes are accurate as of: 1/4/18  8:14 PM.  If you have any questions, ask your nurse or doctor.               Stop taking these medicines if you haven't already. Please contact your care team if you have questions.     doxylamine 25 MG Tabs tablet   Commonly known as:  UNISOM   Stopped by:  aRad Pennington MD                    Primary Care Provider Office Phone # Fax #    Kallie Mari Goldberg [phone] [address] [facility] [address]        Equal Access to Services     CHRISTIANO THIBODEAUX : Hadii aad ku hadasho Somartineali, waaxda luqadaha, qaybta kaalmada adeegyada, willard galdamez . So [facility] [phone].    ATENCIÓN: Si habla español, tiene a kuo disposición servicios gratuitos de asistencia lingüística. lin al [phone].    We comply with applicable federal civil rights laws and [place] laws. We do not discriminate on the basis of race, color, national origin, age, disability, sex, sexual orientation, or gender identity.            Thank you!     Thank you for choosing [facility]  for your care. Our goal is always to provide you with excellent care. Hearing back from our patients is one way we can continue to improve our services. Please take a few minutes to complete the written survey that you " may receive in the mail after your visit with us. Thank you!             Your Updated Medication List - Protect others around you: Learn how to safely use, store and throw away your medicines at www.disposemymeds.org.          This list is accurate as of: 1/4/18  8:14 PM.  Always use your most recent med list.                   Brand Name Dispense Instructions for use Diagnosis    ondansetron 4 MG ODT tab    ZOFRAN ODT    30 tablet    Take 1 tablet (4 mg) by mouth every 8 hours as needed for nausea    Nausea/vomiting in pregnancy       PRENATAL ADULT GUMMY/DHA/FA PO              good, to achieve stated therapy goals

## 2023-06-02 ENCOUNTER — HEALTH MAINTENANCE LETTER (OUTPATIENT)
Age: 28
End: 2023-06-02